# Patient Record
Sex: FEMALE | Race: WHITE | NOT HISPANIC OR LATINO | Employment: UNEMPLOYED | URBAN - METROPOLITAN AREA
[De-identification: names, ages, dates, MRNs, and addresses within clinical notes are randomized per-mention and may not be internally consistent; named-entity substitution may affect disease eponyms.]

---

## 2021-01-01 ENCOUNTER — HOSPITAL ENCOUNTER (INPATIENT)
Facility: CLINIC | Age: 46
LOS: 1 days | End: 2021-10-15
Attending: INTERNAL MEDICINE | Admitting: SURGERY
Payer: COMMERCIAL

## 2021-01-01 ENCOUNTER — ANESTHESIA (OUTPATIENT)
Dept: INTENSIVE CARE | Facility: CLINIC | Age: 46
End: 2021-01-01
Payer: COMMERCIAL

## 2021-01-01 ENCOUNTER — ANESTHESIA EVENT (OUTPATIENT)
Dept: INTENSIVE CARE | Facility: CLINIC | Age: 46
End: 2021-01-01
Payer: COMMERCIAL

## 2021-01-01 ENCOUNTER — APPOINTMENT (OUTPATIENT)
Dept: GENERAL RADIOLOGY | Facility: CLINIC | Age: 46
End: 2021-01-01
Attending: INTERNAL MEDICINE
Payer: COMMERCIAL

## 2021-01-01 ENCOUNTER — HOSPITAL ENCOUNTER (EMERGENCY)
Facility: HOSPITAL | Age: 46
Discharge: ANOTHER HOSPITAL | End: 2021-10-14
Attending: EMERGENCY MEDICINE | Admitting: EMERGENCY MEDICINE
Payer: COMMERCIAL

## 2021-01-01 ENCOUNTER — TRANSFERRED RECORDS (OUTPATIENT)
Dept: HEALTH INFORMATION MANAGEMENT | Facility: CLINIC | Age: 46
End: 2021-01-01

## 2021-01-01 ENCOUNTER — APPOINTMENT (OUTPATIENT)
Dept: ULTRASOUND IMAGING | Facility: CLINIC | Age: 46
End: 2021-01-01
Attending: INTERNAL MEDICINE
Payer: COMMERCIAL

## 2021-01-01 ENCOUNTER — APPOINTMENT (OUTPATIENT)
Dept: GENERAL RADIOLOGY | Facility: CLINIC | Age: 46
End: 2021-01-01
Attending: NURSE PRACTITIONER
Payer: COMMERCIAL

## 2021-01-01 ENCOUNTER — APPOINTMENT (OUTPATIENT)
Dept: CARDIOLOGY | Facility: CLINIC | Age: 46
End: 2021-01-01
Attending: INTERNAL MEDICINE
Payer: COMMERCIAL

## 2021-01-01 ENCOUNTER — APPOINTMENT (OUTPATIENT)
Dept: RADIOLOGY | Facility: HOSPITAL | Age: 46
End: 2021-01-01
Attending: EMERGENCY MEDICINE
Payer: COMMERCIAL

## 2021-01-01 VITALS
RESPIRATION RATE: 29 BRPM | BODY MASS INDEX: 23.96 KG/M2 | HEART RATE: 119 BPM | TEMPERATURE: 98.3 F | DIASTOLIC BLOOD PRESSURE: 60 MMHG | OXYGEN SATURATION: 93 % | SYSTOLIC BLOOD PRESSURE: 143 MMHG | WEIGHT: 153 LBS

## 2021-01-01 VITALS
TEMPERATURE: 98.4 F | BODY MASS INDEX: 28.21 KG/M2 | WEIGHT: 180.12 LBS | OXYGEN SATURATION: 90 % | DIASTOLIC BLOOD PRESSURE: 69 MMHG | SYSTOLIC BLOOD PRESSURE: 116 MMHG | RESPIRATION RATE: 33 BRPM

## 2021-01-01 DIAGNOSIS — R94.31 PROLONGED Q-T INTERVAL ON ECG: ICD-10-CM

## 2021-01-01 DIAGNOSIS — E72.20 HYPERAMMONEMIA (H): ICD-10-CM

## 2021-01-01 DIAGNOSIS — E87.6 HYPOKALEMIA: ICD-10-CM

## 2021-01-01 DIAGNOSIS — F10.10 ALCOHOL ABUSE: ICD-10-CM

## 2021-01-01 DIAGNOSIS — K76.82 HEPATIC ENCEPHALOPATHY (H): ICD-10-CM

## 2021-01-01 DIAGNOSIS — K85.90 ACUTE PANCREATITIS, UNSPECIFIED COMPLICATION STATUS, UNSPECIFIED PANCREATITIS TYPE: ICD-10-CM

## 2021-01-01 DIAGNOSIS — R79.89 ELEVATED LACTIC ACID LEVEL: ICD-10-CM

## 2021-01-01 DIAGNOSIS — K72.00 ACUTE LIVER FAILURE WITHOUT HEPATIC COMA: ICD-10-CM

## 2021-01-01 DIAGNOSIS — R74.8 ELEVATED LIVER ENZYMES: ICD-10-CM

## 2021-01-01 LAB
ABO/RH(D): NORMAL
ABO/RH(D): NORMAL
ALBUMIN SERPL-MCNC: 1.5 G/DL (ref 3.4–5)
ALBUMIN SERPL-MCNC: 1.7 G/DL (ref 3.4–5)
ALBUMIN SERPL-MCNC: 1.7 G/DL (ref 3.4–5)
ALBUMIN SERPL-MCNC: 2 G/DL (ref 3.5–5)
ALBUMIN UR-MCNC: 30 MG/DL
ALP SERPL-CCNC: 338 U/L (ref 40–150)
ALP SERPL-CCNC: 353 U/L (ref 40–150)
ALP SERPL-CCNC: 358 U/L (ref 40–150)
ALP SERPL-CCNC: 461 U/L (ref 40–150)
ALP SERPL-CCNC: 522 U/L (ref 45–120)
ALT SERPL W P-5'-P-CCNC: 55 U/L (ref 0–45)
ALT SERPL W P-5'-P-CCNC: 63 U/L (ref 0–50)
ALT SERPL W P-5'-P-CCNC: 68 U/L (ref 0–50)
ALT SERPL W P-5'-P-CCNC: 85 U/L (ref 0–50)
ALT SERPL W P-5'-P-CCNC: 88 U/L (ref 0–50)
AMMONIA PLAS-SCNC: 116 UMOL/L (ref 11–35)
AMPHETAMINES UR QL SCN: NORMAL
ANA SER QL IF: NEGATIVE
ANCA AB PATTERN SER IF-IMP: NORMAL
ANION GAP SERPL CALCULATED.3IONS-SCNC: 25 MMOL/L (ref 3–14)
ANION GAP SERPL CALCULATED.3IONS-SCNC: 25 MMOL/L (ref 3–14)
ANION GAP SERPL CALCULATED.3IONS-SCNC: 26 MMOL/L (ref 3–14)
ANION GAP SERPL CALCULATED.3IONS-SCNC: 32 MMOL/L (ref 3–14)
ANION GAP SERPL CALCULATED.3IONS-SCNC: 40 MMOL/L (ref 5–18)
ANION GAP SERPL CALCULATED.3IONS-SCNC: >35 MMOL/L (ref 3–14)
ANTIBODY SCREEN: NEGATIVE
ANTIBODY SCREEN: NEGATIVE
APAP SERPL-MCNC: <3 UG/ML (ref 10–20)
APPEARANCE UR: ABNORMAL
APTT PPP: 39 SECONDS (ref 22–38)
APTT PPP: 40 SECONDS (ref 22–38)
APTT PPP: 47 SECONDS (ref 22–38)
AST SERPL W P-5'-P-CCNC: 1539 U/L (ref 0–45)
AST SERPL W P-5'-P-CCNC: 1826 U/L (ref 0–45)
AST SERPL W P-5'-P-CCNC: 2077 U/L (ref 0–45)
AST SERPL W P-5'-P-CCNC: 363 U/L (ref 0–40)
AST SERPL W P-5'-P-CCNC: 967 U/L (ref 0–45)
ATRIAL RATE - MUSE: 130 BPM
BACTERIA UR CULT: NO GROWTH
BARBITURATES UR QL: NORMAL
BASE EXCESS BLDA CALC-SCNC: -11.8 MMOL/L (ref -9–1.8)
BASE EXCESS BLDA CALC-SCNC: -14.1 MMOL/L (ref -9–1.8)
BASE EXCESS BLDA CALC-SCNC: -14.6 MMOL/L (ref -9–1.8)
BASE EXCESS BLDA CALC-SCNC: -14.7 MMOL/L (ref -9–1.8)
BASE EXCESS BLDA CALC-SCNC: -19.5 MMOL/L (ref -9–1.8)
BASE EXCESS BLDA CALC-SCNC: -8.6 MMOL/L (ref -9–1.8)
BASE EXCESS BLDA CALC-SCNC: -9.1 MMOL/L (ref -9–1.8)
BASE EXCESS BLDA CALC-SCNC: -9.2 MMOL/L (ref -9–1.8)
BASE EXCESS BLDA CALC-SCNC: -9.6 MMOL/L (ref -9–1.8)
BASE EXCESS BLDV CALC-SCNC: -19.3 MMOL/L (ref -7.7–1.9)
BASE EXCESS BLDV CALC-SCNC: -19.7 MMOL/L (ref -7.7–1.9)
BASE EXCESS BLDV CALC-SCNC: -8.1 MMOL/L (ref -7.7–1.9)
BASE EXCESS BLDV CALC-SCNC: -8.7 MMOL/L (ref -7.7–1.9)
BASOPHILS # BLD MANUAL: 0 10E3/UL (ref 0–0.2)
BASOPHILS # BLD MANUAL: 0 10E3/UL (ref 0–0.2)
BASOPHILS NFR BLD MANUAL: 0 %
BASOPHILS NFR BLD MANUAL: 0 %
BENZODIAZ UR QL: NORMAL
BILIRUB DIRECT SERPL-MCNC: 10 MG/DL (ref 0–0.2)
BILIRUB DIRECT SERPL-MCNC: 10.1 MG/DL (ref 0–0.2)
BILIRUB DIRECT SERPL-MCNC: 7 MG/DL
BILIRUB DIRECT SERPL-MCNC: 8.9 MG/DL (ref 0–0.2)
BILIRUB DIRECT SERPL-MCNC: 8.9 MG/DL (ref 0–0.2)
BILIRUB SERPL-MCNC: 10.6 MG/DL (ref 0.2–1.3)
BILIRUB SERPL-MCNC: 10.7 MG/DL (ref 0.2–1.3)
BILIRUB SERPL-MCNC: 12.2 MG/DL (ref 0–1)
BILIRUB SERPL-MCNC: 12.8 MG/DL (ref 0.2–1.3)
BILIRUB SERPL-MCNC: 13.2 MG/DL (ref 0.2–1.3)
BILIRUB UR QL STRIP: ABNORMAL
BUN SERPL-MCNC: 3 MG/DL (ref 7–30)
BUN SERPL-MCNC: 3 MG/DL (ref 7–30)
BUN SERPL-MCNC: 3 MG/DL (ref 8–22)
BUN SERPL-MCNC: 4 MG/DL (ref 7–30)
C PNEUM DNA SPEC QL NAA+PROBE: NOT DETECTED
C-ANCA TITR SER IF: NORMAL {TITER}
CA-I BLD-MCNC: 3.7 MG/DL (ref 4.4–5.2)
CA-I BLD-MCNC: 3.8 MG/DL (ref 4.4–5.2)
CA-I BLD-MCNC: 3.8 MG/DL (ref 4.4–5.2)
CA-I BLD-MCNC: 3.9 MG/DL (ref 4.4–5.2)
CALCIUM SERPL-MCNC: 6.7 MG/DL (ref 8.5–10.1)
CALCIUM SERPL-MCNC: 6.8 MG/DL (ref 8.5–10.1)
CALCIUM SERPL-MCNC: 7.2 MG/DL (ref 8.5–10.1)
CALCIUM SERPL-MCNC: 7.2 MG/DL (ref 8.5–10.1)
CALCIUM SERPL-MCNC: 7.4 MG/DL (ref 8.5–10.1)
CALCIUM SERPL-MCNC: 7.6 MG/DL (ref 8.5–10.1)
CALCIUM SERPL-MCNC: 7.6 MG/DL (ref 8.5–10.1)
CALCIUM SERPL-MCNC: 7.9 MG/DL (ref 8.5–10.5)
CANNABINOIDS UR QL SCN: NORMAL
CF REDUC 60M P MA LENFR BLD TEG: 0 % (ref 0–15)
CF REDUC 60M P MA LENFR BLD TEG: 0 % (ref 0–15)
CFT BLD TEG: 1.3 MINUTE (ref 1–3)
CFT BLD TEG: 1.7 MINUTE (ref 1–3)
CHLORIDE BLD-SCNC: 86 MMOL/L (ref 98–107)
CHLORIDE BLD-SCNC: 89 MMOL/L (ref 94–109)
CHLORIDE BLD-SCNC: 93 MMOL/L (ref 94–109)
CHLORIDE BLD-SCNC: 94 MMOL/L (ref 94–109)
CI (COAGULATION INDEX)(Z) NON NATIVE: -1.6 (ref -3–3)
CI (COAGULATION INDEX)(Z) NON NATIVE: 0.5 (ref -3–3)
CK SERPL-CCNC: 54 U/L (ref 30–225)
CLOT ANGLE BLD TEG: 66.8 DEGREES (ref 53–72)
CLOT ANGLE BLD TEG: 71.8 DEGREES (ref 53–72)
CLOT INIT BLD TEG: 6.8 MINUTE (ref 5–10)
CLOT INIT BLD TEG: 9.2 MINUTE (ref 5–10)
CLOT LYSIS 30M P MA LENFR BLD TEG: 0 % (ref 0–8)
CLOT LYSIS 30M P MA LENFR BLD TEG: 0 % (ref 0–8)
CLOT STRENGTH BLD TEG: 8.4 KD/SC (ref 4.5–11)
CLOT STRENGTH BLD TEG: 8.7 KD/SC (ref 4.5–11)
CO2 SERPL-SCNC: 13 MMOL/L (ref 20–32)
CO2 SERPL-SCNC: 17 MMOL/L (ref 20–32)
CO2 SERPL-SCNC: 18 MMOL/L (ref 20–32)
CO2 SERPL-SCNC: 8 MMOL/L (ref 20–32)
CO2 SERPL-SCNC: 9 MMOL/L (ref 22–31)
COCAINE UR QL: NORMAL
COLOR UR AUTO: ABNORMAL
CREAT SERPL-MCNC: 0.58 MG/DL (ref 0.6–1.1)
CREAT SERPL-MCNC: 0.81 MG/DL (ref 0.52–1.04)
CREAT SERPL-MCNC: 0.88 MG/DL (ref 0.52–1.04)
CREAT SERPL-MCNC: 0.88 MG/DL (ref 0.52–1.04)
CREAT SERPL-MCNC: 0.9 MG/DL (ref 0.52–1.04)
CREAT SERPL-MCNC: 0.93 MG/DL (ref 0.52–1.04)
CREAT SERPL-MCNC: 0.96 MG/DL (ref 0.52–1.04)
CREAT SERPL-MCNC: 1.02 MG/DL (ref 0.52–1.04)
CREAT UR-MCNC: 51 MG/DL
DIASTOLIC BLOOD PRESSURE - MUSE: 75 MMHG
EOSINOPHIL # BLD MANUAL: 0 10E3/UL (ref 0–0.7)
EOSINOPHIL # BLD MANUAL: 0 10E3/UL (ref 0–0.7)
EOSINOPHIL NFR BLD MANUAL: 0 %
EOSINOPHIL NFR BLD MANUAL: 0 %
ERYTHROCYTE [DISTWIDTH] IN BLOOD BY AUTOMATED COUNT: 14.8 % (ref 10–15)
ERYTHROCYTE [DISTWIDTH] IN BLOOD BY AUTOMATED COUNT: 14.8 % (ref 10–15)
ERYTHROCYTE [DISTWIDTH] IN BLOOD BY AUTOMATED COUNT: 15.1 % (ref 10–15)
ERYTHROCYTE [DISTWIDTH] IN BLOOD BY AUTOMATED COUNT: 16.7 % (ref 10–15)
ERYTHROCYTE [DISTWIDTH] IN BLOOD BY AUTOMATED COUNT: 17.8 % (ref 10–15)
ERYTHROCYTE [DISTWIDTH] IN BLOOD BY AUTOMATED COUNT: 18.6 % (ref 10–15)
ERYTHROCYTE [DISTWIDTH] IN BLOOD BY AUTOMATED COUNT: 19.2 % (ref 10–15)
ERYTHROCYTE [DISTWIDTH] IN BLOOD BY AUTOMATED COUNT: 19.8 % (ref 10–15)
ERYTHROCYTE [DISTWIDTH] IN BLOOD BY AUTOMATED COUNT: 20.1 % (ref 10–15)
ERYTHROCYTE [DISTWIDTH] IN BLOOD BY AUTOMATED COUNT: 20.1 % (ref 10–15)
ETHANOL SERPL-MCNC: 108 MG/DL
FERRITIN SERPL-MCNC: 7749 NG/ML (ref 12–150)
FIBRINOGEN PPP-MCNC: 157 MG/DL (ref 170–490)
FIBRINOGEN PPP-MCNC: 163 MG/DL (ref 170–490)
FIBRINOGEN PPP-MCNC: 175 MG/DL (ref 170–490)
FLUAV H1 2009 PAND RNA SPEC QL NAA+PROBE: NOT DETECTED
FLUAV H1 RNA SPEC QL NAA+PROBE: NOT DETECTED
FLUAV H3 RNA SPEC QL NAA+PROBE: NOT DETECTED
FLUAV RNA SPEC QL NAA+PROBE: NOT DETECTED
FLUBV RNA SPEC QL NAA+PROBE: NOT DETECTED
GBM IGG SER IA-ACNC: <2.4 U/ML
GBM IGG SER IA-ACNC: NEGATIVE
GFR SERPL CREATININE-BSD FRML MDRD: 67 ML/MIN/1.73M2
GFR SERPL CREATININE-BSD FRML MDRD: 72 ML/MIN/1.73M2
GFR SERPL CREATININE-BSD FRML MDRD: 75 ML/MIN/1.73M2
GFR SERPL CREATININE-BSD FRML MDRD: 78 ML/MIN/1.73M2
GFR SERPL CREATININE-BSD FRML MDRD: 80 ML/MIN/1.73M2
GFR SERPL CREATININE-BSD FRML MDRD: 80 ML/MIN/1.73M2
GFR SERPL CREATININE-BSD FRML MDRD: 89 ML/MIN/1.73M2
GFR SERPL CREATININE-BSD FRML MDRD: >90 ML/MIN/1.73M2
GGT SERPL-CCNC: 1369 U/L (ref 0–40)
GLIADIN IGA SER-ACNC: 1.4 U/ML
GLIADIN IGG SER-ACNC: 1.1 U/ML
GLUCOSE BLD-MCNC: 125 MG/DL (ref 70–99)
GLUCOSE BLD-MCNC: 135 MG/DL (ref 70–99)
GLUCOSE BLD-MCNC: 201 MG/DL (ref 70–99)
GLUCOSE BLD-MCNC: 201 MG/DL (ref 70–99)
GLUCOSE BLD-MCNC: 43 MG/DL (ref 70–99)
GLUCOSE BLD-MCNC: 58 MG/DL (ref 70–125)
GLUCOSE BLD-MCNC: 58 MG/DL (ref 70–99)
GLUCOSE BLD-MCNC: 79 MG/DL (ref 70–99)
GLUCOSE BLDC GLUCOMTR-MCNC: 124 MG/DL (ref 70–99)
GLUCOSE BLDC GLUCOMTR-MCNC: 126 MG/DL (ref 70–99)
GLUCOSE BLDC GLUCOMTR-MCNC: 129 MG/DL (ref 70–99)
GLUCOSE BLDC GLUCOMTR-MCNC: 134 MG/DL (ref 70–99)
GLUCOSE BLDC GLUCOMTR-MCNC: 137 MG/DL (ref 70–99)
GLUCOSE BLDC GLUCOMTR-MCNC: 141 MG/DL (ref 70–99)
GLUCOSE BLDC GLUCOMTR-MCNC: 147 MG/DL (ref 70–99)
GLUCOSE BLDC GLUCOMTR-MCNC: 152 MG/DL (ref 70–99)
GLUCOSE BLDC GLUCOMTR-MCNC: 157 MG/DL (ref 70–99)
GLUCOSE BLDC GLUCOMTR-MCNC: 207 MG/DL (ref 70–99)
GLUCOSE BLDC GLUCOMTR-MCNC: 49 MG/DL (ref 70–99)
GLUCOSE BLDC GLUCOMTR-MCNC: 55 MG/DL (ref 70–99)
GLUCOSE BLDC GLUCOMTR-MCNC: 55 MG/DL (ref 70–99)
GLUCOSE BLDC GLUCOMTR-MCNC: 81 MG/DL (ref 70–99)
GLUCOSE BLDC GLUCOMTR-MCNC: 85 MG/DL (ref 70–99)
GLUCOSE BLDC GLUCOMTR-MCNC: 92 MG/DL (ref 70–99)
GLUCOSE UR STRIP-MCNC: NEGATIVE MG/DL
HADV DNA SPEC QL NAA+PROBE: NOT DETECTED
HAPTOGLOB SERPL-MCNC: 95 MG/DL (ref 32–197)
HAV IGG SER QL IA: REACTIVE
HAV IGM SERPL QL IA: NEGATIVE
HAV IGM SERPL QL IA: NONREACTIVE
HBV CORE AB SERPL QL IA: NONREACTIVE
HBV CORE IGM SERPL QL IA: NEGATIVE
HBV SURFACE AG SERPL QL IA: NONREACTIVE
HBV SURFACE AG SERPL QL IA: NONREACTIVE
HCG SERPL QL: NEGATIVE
HCO3 BLD-SCNC: 12 MMOL/L (ref 21–28)
HCO3 BLD-SCNC: 13 MMOL/L (ref 21–28)
HCO3 BLD-SCNC: 13 MMOL/L (ref 21–28)
HCO3 BLD-SCNC: 15 MMOL/L (ref 21–28)
HCO3 BLD-SCNC: 17 MMOL/L (ref 21–28)
HCO3 BLD-SCNC: 9 MMOL/L (ref 21–28)
HCO3 BLDV-SCNC: 18 MMOL/L (ref 21–28)
HCO3 BLDV-SCNC: 18 MMOL/L (ref 21–28)
HCO3 BLDV-SCNC: 7 MMOL/L (ref 21–28)
HCO3 BLDV-SCNC: 8 MMOL/L (ref 21–28)
HCOV PNL SPEC NAA+PROBE: NOT DETECTED
HCT VFR BLD AUTO: 19.5 % (ref 35–47)
HCT VFR BLD AUTO: 23.4 % (ref 35–47)
HCT VFR BLD AUTO: 24.4 % (ref 35–47)
HCT VFR BLD AUTO: 25.1 % (ref 35–47)
HCT VFR BLD AUTO: 26.3 % (ref 35–47)
HCT VFR BLD AUTO: 26.8 % (ref 35–47)
HCT VFR BLD AUTO: 26.8 % (ref 35–47)
HCT VFR BLD AUTO: 27.7 % (ref 35–47)
HCT VFR BLD AUTO: 28 % (ref 35–47)
HCT VFR BLD AUTO: 28.1 % (ref 35–47)
HCV AB SERPL QL IA: NEGATIVE
HGB BLD-MCNC: 5.8 G/DL (ref 11.7–15.7)
HGB BLD-MCNC: 7.2 G/DL (ref 11.7–15.7)
HGB BLD-MCNC: 7.5 G/DL (ref 11.7–15.7)
HGB BLD-MCNC: 7.9 G/DL (ref 11.7–15.7)
HGB BLD-MCNC: 8.4 G/DL (ref 11.7–15.7)
HGB BLD-MCNC: 9.3 G/DL (ref 11.7–15.7)
HGB BLD-MCNC: 9.6 G/DL (ref 11.7–15.7)
HGB BLD-MCNC: 9.6 G/DL (ref 11.7–15.7)
HGB UR QL STRIP: ABNORMAL
HIV 1+2 AB+HIV1 P24 AG SERPL QL IA: NONREACTIVE
HMPV RNA SPEC QL NAA+PROBE: NOT DETECTED
HOLD SPECIMEN: NORMAL
HPIV1 RNA SPEC QL NAA+PROBE: NOT DETECTED
HPIV2 RNA SPEC QL NAA+PROBE: NOT DETECTED
HPIV3 RNA SPEC QL NAA+PROBE: NOT DETECTED
HPIV4 RNA SPEC QL NAA+PROBE: NOT DETECTED
HYALINE CASTS: 8 /LPF
IGA SERPL-MCNC: 293 MG/DL (ref 84–499)
INR PPP: 1.55 (ref 0.85–1.15)
INR PPP: 1.78 (ref 0.85–1.15)
INR PPP: 2.13 (ref 0.85–1.15)
INR PPP: 2.24 (ref 0.85–1.15)
INTERPRETATION ECG - MUSE: NORMAL
IRON SATN MFR SERPL: 100 % (ref 15–46)
IRON SERPL-MCNC: 76 UG/DL (ref 35–180)
KETONES UR STRIP-MCNC: 40 MG/DL
LACTATE SERPL-SCNC: 19 MMOL/L (ref 0.7–2)
LACTATE SERPL-SCNC: 20 MMOL/L (ref 0.7–2)
LACTATE SERPL-SCNC: 21 MMOL/L (ref 0.7–2)
LACTATE SERPL-SCNC: 21 MMOL/L (ref 0.7–2)
LACTATE SERPL-SCNC: 23 MMOL/L (ref 0.7–2)
LACTATE SERPL-SCNC: 23 MMOL/L (ref 0.7–2)
LACTATE SERPL-SCNC: 26 MMOL/L (ref 0.7–2)
LACTATE SERPL-SCNC: 27 MMOL/L (ref 0.7–2)
LACTATE SERPL-SCNC: >27.9 MMOL/L (ref 0.7–2)
LACTATE SERPL-SCNC: >27.9 MMOL/L (ref 0.7–2)
LDH SERPL L TO P-CCNC: 1213 U/L (ref 81–234)
LEUKOCYTE ESTERASE UR QL STRIP: NEGATIVE
LIPASE SERPL-CCNC: 7112 U/L (ref 73–393)
LIPASE SERPL-CCNC: 758 U/L (ref 0–52)
LVEF ECHO: NORMAL
LYMPHOCYTES # BLD MANUAL: 0.3 10E3/UL (ref 0.8–5.3)
LYMPHOCYTES # BLD MANUAL: 0.3 10E3/UL (ref 0.8–5.3)
LYMPHOCYTES NFR BLD MANUAL: 3 %
LYMPHOCYTES NFR BLD MANUAL: 5 %
M PNEUMO DNA SPEC QL NAA+PROBE: NOT DETECTED
MAGNESIUM SERPL-MCNC: 1.6 MG/DL (ref 1.6–2.3)
MAGNESIUM SERPL-MCNC: 1.8 MG/DL (ref 1.8–2.6)
MAGNESIUM SERPL-MCNC: 1.9 MG/DL (ref 1.6–2.3)
MAGNESIUM SERPL-MCNC: 2.3 MG/DL (ref 1.6–2.3)
MAGNESIUM SERPL-MCNC: 2.4 MG/DL (ref 1.6–2.3)
MAGNESIUM SERPL-MCNC: 2.5 MG/DL (ref 1.6–2.3)
MCF BLD TEG: 62.8 MM (ref 50–70)
MCF BLD TEG: 63.5 MM (ref 50–70)
MCH RBC QN AUTO: 35.7 PG (ref 26.5–33)
MCH RBC QN AUTO: 36.5 PG (ref 26.5–33)
MCH RBC QN AUTO: 36.7 PG (ref 26.5–33)
MCH RBC QN AUTO: 36.9 PG (ref 26.5–33)
MCH RBC QN AUTO: 37.5 PG (ref 26.5–33)
MCH RBC QN AUTO: 37.5 PG (ref 26.5–33)
MCH RBC QN AUTO: 37.6 PG (ref 26.5–33)
MCH RBC QN AUTO: 37.9 PG (ref 26.5–33)
MCH RBC QN AUTO: 39.2 PG (ref 26.5–33)
MCH RBC QN AUTO: 39.2 PG (ref 26.5–33)
MCHC RBC AUTO-ENTMCNC: 29.7 G/DL (ref 31.5–36.5)
MCHC RBC AUTO-ENTMCNC: 30.7 G/DL (ref 31.5–36.5)
MCHC RBC AUTO-ENTMCNC: 30.8 G/DL (ref 31.5–36.5)
MCHC RBC AUTO-ENTMCNC: 31.5 G/DL (ref 31.5–36.5)
MCHC RBC AUTO-ENTMCNC: 31.9 G/DL (ref 31.5–36.5)
MCHC RBC AUTO-ENTMCNC: 33.6 G/DL (ref 31.5–36.5)
MCHC RBC AUTO-ENTMCNC: 34.2 G/DL (ref 31.5–36.5)
MCHC RBC AUTO-ENTMCNC: 34.3 G/DL (ref 31.5–36.5)
MCHC RBC AUTO-ENTMCNC: 34.7 G/DL (ref 31.5–36.5)
MCHC RBC AUTO-ENTMCNC: 34.7 G/DL (ref 31.5–36.5)
MCV RBC AUTO: 110 FL (ref 78–100)
MCV RBC AUTO: 112 FL (ref 78–100)
MCV RBC AUTO: 113 FL (ref 78–100)
MCV RBC AUTO: 113 FL (ref 78–100)
MCV RBC AUTO: 117 FL (ref 78–100)
MCV RBC AUTO: 119 FL (ref 78–100)
MCV RBC AUTO: 122 FL (ref 78–100)
MCV RBC AUTO: 128 FL (ref 78–100)
METAMYELOCYTES # BLD MANUAL: 0.1 10E3/UL
METAMYELOCYTES NFR BLD MANUAL: 1 %
MONOCYTES # BLD MANUAL: 0.2 10E3/UL (ref 0–1.3)
MONOCYTES # BLD MANUAL: 0.2 10E3/UL (ref 0–1.3)
MONOCYTES NFR BLD MANUAL: 2 %
MONOCYTES NFR BLD MANUAL: 4 %
MRSA DNA SPEC QL NAA+PROBE: NEGATIVE
MYELOCYTES # BLD MANUAL: 0.1 10E3/UL
MYELOCYTES NFR BLD MANUAL: 1 %
NEUTROPHILS # BLD MANUAL: 4.6 10E3/UL (ref 1.6–8.3)
NEUTROPHILS # BLD MANUAL: 8 10E3/UL (ref 1.6–8.3)
NEUTROPHILS NFR BLD MANUAL: 91 %
NEUTROPHILS NFR BLD MANUAL: 93 %
NITRATE UR QL: NEGATIVE
NRBC # BLD AUTO: 0.1 10E3/UL
NRBC # BLD AUTO: 0.3 10E3/UL
NRBC BLD MANUAL-RTO: 1 %
NRBC BLD MANUAL-RTO: 3 %
O2/TOTAL GAS SETTING VFR VENT: 100 %
O2/TOTAL GAS SETTING VFR VENT: 4 %
O2/TOTAL GAS SETTING VFR VENT: 70 %
O2/TOTAL GAS SETTING VFR VENT: 80 %
OPIATES UR QL SCN: NORMAL
OXYCODONE UR QL: NORMAL
OXYHGB MFR BLDV: 59 % (ref 70–75)
OXYHGB MFR BLDV: 66 % (ref 70–75)
OXYHGB MFR BLDV: 68 % (ref 70–75)
OXYHGB MFR BLDV: 68 % (ref 70–75)
P AXIS - MUSE: NORMAL DEGREES
PATH REPORT.COMMENTS IMP SPEC: NORMAL
PATH REPORT.FINAL DX SPEC: NORMAL
PATH REPORT.MICROSCOPIC SPEC OTHER STN: NORMAL
PATH REPORT.MICROSCOPIC SPEC OTHER STN: NORMAL
PATH REPORT.RELEVANT HX SPEC: NORMAL
PATH REV: ABNORMAL
PCO2 BLD: 28 MM HG (ref 35–45)
PCO2 BLD: 29 MM HG (ref 35–45)
PCO2 BLD: 34 MM HG (ref 35–45)
PCO2 BLD: 36 MM HG (ref 35–45)
PCO2 BLD: 37 MM HG (ref 35–45)
PCO2 BLD: 38 MM HG (ref 35–45)
PCO2 BLD: 38 MM HG (ref 35–45)
PCO2 BLDV: 21 MM HG (ref 40–50)
PCO2 BLDV: 24 MM HG (ref 40–50)
PCO2 BLDV: 40 MM HG (ref 40–50)
PCO2 BLDV: 41 MM HG (ref 40–50)
PCP UR QL SCN: NORMAL
PH BLD: 7.09 [PH] (ref 7.35–7.45)
PH BLD: 7.17 [PH] (ref 7.35–7.45)
PH BLD: 7.19 [PH] (ref 7.35–7.45)
PH BLD: 7.21 [PH] (ref 7.35–7.45)
PH BLD: 7.23 [PH] (ref 7.35–7.45)
PH BLD: 7.26 [PH] (ref 7.35–7.45)
PH BLD: 7.27 [PH] (ref 7.35–7.45)
PH BLD: 7.28 [PH] (ref 7.35–7.45)
PH BLD: 7.28 [PH] (ref 7.35–7.45)
PH BLDV: 7.14 [PH] (ref 7.32–7.43)
PH BLDV: 7.15 [PH] (ref 7.32–7.43)
PH BLDV: 7.25 [PH] (ref 7.32–7.43)
PH BLDV: 7.27 [PH] (ref 7.32–7.43)
PH UR STRIP: 5.5 [PH] (ref 5–7)
PHOSPHATE SERPL-MCNC: 1.6 MG/DL (ref 2.5–4.5)
PHOSPHATE SERPL-MCNC: 2.2 MG/DL (ref 2.5–4.5)
PHOSPHATE SERPL-MCNC: 2.4 MG/DL (ref 2.5–4.5)
PHOSPHATE SERPL-MCNC: 2.7 MG/DL (ref 2.5–4.5)
PHOSPHATE SERPL-MCNC: 2.8 MG/DL (ref 2.5–4.5)
PLAT MORPH BLD: ABNORMAL
PLAT MORPH BLD: ABNORMAL
PLATELET # BLD AUTO: 131 10E3/UL (ref 150–450)
PLATELET # BLD AUTO: 149 10E3/UL (ref 150–450)
PLATELET # BLD AUTO: 77 10E3/UL (ref 150–450)
PLATELET # BLD AUTO: 85 10E3/UL (ref 150–450)
PLATELET # BLD AUTO: 85 10E3/UL (ref 150–450)
PLATELET # BLD AUTO: 91 10E3/UL (ref 150–450)
PLATELET # BLD AUTO: 92 10E3/UL (ref 150–450)
PLATELET # BLD AUTO: 92 10E3/UL (ref 150–450)
PLATELET # BLD AUTO: 97 10E3/UL (ref 150–450)
PLATELET # BLD AUTO: 98 10E3/UL (ref 150–450)
PO2 BLD: 108 MM HG (ref 80–105)
PO2 BLD: 183 MM HG (ref 80–105)
PO2 BLD: 65 MM HG (ref 80–105)
PO2 BLD: 69 MM HG (ref 80–105)
PO2 BLD: 74 MM HG (ref 80–105)
PO2 BLD: 83 MM HG (ref 80–105)
PO2 BLD: 85 MM HG (ref 80–105)
PO2 BLD: 90 MM HG (ref 80–105)
PO2 BLD: 95 MM HG (ref 80–105)
PO2 BLDV: 42 MM HG (ref 25–47)
PO2 BLDV: 47 MM HG (ref 25–47)
PO2 BLDV: 48 MM HG (ref 25–47)
PO2 BLDV: 52 MM HG (ref 25–47)
POTASSIUM BLD-SCNC: 2.8 MMOL/L (ref 3.5–5)
POTASSIUM BLD-SCNC: 2.9 MMOL/L (ref 3.4–5.3)
POTASSIUM BLD-SCNC: 3.2 MMOL/L (ref 3.4–5.3)
POTASSIUM BLD-SCNC: 3.6 MMOL/L (ref 3.4–5.3)
POTASSIUM BLD-SCNC: 3.6 MMOL/L (ref 3.4–5.3)
POTASSIUM BLD-SCNC: 3.8 MMOL/L (ref 3.4–5.3)
POTASSIUM BLD-SCNC: 3.9 MMOL/L (ref 3.4–5.3)
POTASSIUM BLD-SCNC: 4 MMOL/L (ref 3.4–5.3)
POTASSIUM BLD-SCNC: 4 MMOL/L (ref 3.4–5.3)
PR INTERVAL - MUSE: 112 MS
PROCALCITONIN SERPL-MCNC: 1.63 NG/ML
PROT SERPL-MCNC: 4.8 G/DL (ref 6.8–8.8)
PROT SERPL-MCNC: 4.9 G/DL (ref 6.8–8.8)
PROT SERPL-MCNC: 5 G/DL (ref 6.8–8.8)
PROT SERPL-MCNC: 5.6 G/DL (ref 6.8–8.8)
PROT SERPL-MCNC: 5.9 G/DL (ref 6–8)
QRS DURATION - MUSE: 66 MS
QT - MUSE: 386 MS
QTC - MUSE: 568 MS
R AXIS - MUSE: 11 DEGREES
RBC # BLD AUTO: 1.53 10E6/UL (ref 3.8–5.2)
RBC # BLD AUTO: 1.97 10E6/UL (ref 3.8–5.2)
RBC # BLD AUTO: 2 10E6/UL (ref 3.8–5.2)
RBC # BLD AUTO: 2.15 10E6/UL (ref 3.8–5.2)
RBC # BLD AUTO: 2.35 10E6/UL (ref 3.8–5.2)
RBC # BLD AUTO: 2.37 10E6/UL (ref 3.8–5.2)
RBC # BLD AUTO: 2.37 10E6/UL (ref 3.8–5.2)
RBC # BLD AUTO: 2.52 10E6/UL (ref 3.8–5.2)
RBC # BLD AUTO: 2.55 10E6/UL (ref 3.8–5.2)
RBC # BLD AUTO: 2.56 10E6/UL (ref 3.8–5.2)
RBC MORPH BLD: ABNORMAL
RBC MORPH BLD: ABNORMAL
RBC URINE: 4 /HPF
RETICS # AUTO: 0.03 10E6/UL (ref 0.03–0.1)
RETICS # AUTO: 0.04 10E6/UL (ref 0.03–0.1)
RETICS # AUTO: 0.04 10E6/UL (ref 0.03–0.1)
RETICS/RBC NFR AUTO: 1.6 % (ref 0.5–2)
RETICS/RBC NFR AUTO: 2 % (ref 0.5–2)
RETICS/RBC NFR AUTO: 2.2 % (ref 0.5–2)
RSV RNA SPEC QL NAA+PROBE: NOT DETECTED
RSV RNA SPEC QL NAA+PROBE: NOT DETECTED
RV+EV RNA SPEC QL NAA+PROBE: NOT DETECTED
SA TARGET DNA: NEGATIVE
SARS-COV-2 RNA RESP QL NAA+PROBE: NEGATIVE
SODIUM SERPL-SCNC: 135 MMOL/L (ref 136–145)
SODIUM SERPL-SCNC: 136 MMOL/L (ref 133–144)
SODIUM SERPL-SCNC: 136 MMOL/L (ref 133–144)
SODIUM SERPL-SCNC: 137 MMOL/L (ref 133–144)
SODIUM SERPL-SCNC: 139 MMOL/L (ref 133–144)
SP GR UR STRIP: 1.04 (ref 1–1.03)
SPECIMEN EXPIRATION DATE: NORMAL
SPECIMEN EXPIRATION DATE: NORMAL
SQUAMOUS EPITHELIAL: 3 /HPF
SYSTOLIC BLOOD PRESSURE - MUSE: 133 MMHG
T AXIS - MUSE: 37 DEGREES
TIBC SERPL-MCNC: 76 UG/DL (ref 240–430)
TOXIC GRANULES BLD QL SMEAR: PRESENT
TOXIC GRANULES BLD QL SMEAR: PRESENT
TRIGL SERPL-MCNC: 263 MG/DL
TROPONIN I SERPL-MCNC: 0.03 NG/ML (ref 0–0.29)
TTG IGA SER-ACNC: 0.8 U/ML
TTG IGG SER-ACNC: 0.7 U/ML
UROBILINOGEN UR STRIP-MCNC: <2 MG/DL
VENTRICULAR RATE- MUSE: 130 BPM
WBC # BLD AUTO: 13.7 10E3/UL (ref 4–11)
WBC # BLD AUTO: 14.7 10E3/UL (ref 4–11)
WBC # BLD AUTO: 4.8 10E3/UL (ref 4–11)
WBC # BLD AUTO: 5.1 10E3/UL (ref 4–11)
WBC # BLD AUTO: 5.8 10E3/UL (ref 4–11)
WBC # BLD AUTO: 6.1 10E3/UL (ref 4–11)
WBC # BLD AUTO: 8.6 10E3/UL (ref 4–11)
WBC # BLD AUTO: 9.5 10E3/UL (ref 4–11)
WBC URINE: 29 /HPF

## 2021-01-01 PROCEDURE — 250N000011 HC RX IP 250 OP 636: Performed by: STUDENT IN AN ORGANIZED HEALTH CARE EDUCATION/TRAINING PROGRAM

## 2021-01-01 PROCEDURE — 85730 THROMBOPLASTIN TIME PARTIAL: CPT | Performed by: STUDENT IN AN ORGANIZED HEALTH CARE EDUCATION/TRAINING PROGRAM

## 2021-01-01 PROCEDURE — 999N000157 HC STATISTIC RCP TIME EA 10 MIN

## 2021-01-01 PROCEDURE — 82803 BLOOD GASES ANY COMBINATION: CPT | Performed by: NURSE PRACTITIONER

## 2021-01-01 PROCEDURE — 84478 ASSAY OF TRIGLYCERIDES: CPT | Performed by: NURSE PRACTITIONER

## 2021-01-01 PROCEDURE — 82248 BILIRUBIN DIRECT: CPT | Performed by: EMERGENCY MEDICINE

## 2021-01-01 PROCEDURE — 71045 X-RAY EXAM CHEST 1 VIEW: CPT

## 2021-01-01 PROCEDURE — 36592 COLLECT BLOOD FROM PICC: CPT

## 2021-01-01 PROCEDURE — 83735 ASSAY OF MAGNESIUM: CPT | Performed by: INTERNAL MEDICINE

## 2021-01-01 PROCEDURE — 250N000009 HC RX 250: Performed by: EMERGENCY MEDICINE

## 2021-01-01 PROCEDURE — 258N000001 HC RX 258: Performed by: STUDENT IN AN ORGANIZED HEALTH CARE EDUCATION/TRAINING PROGRAM

## 2021-01-01 PROCEDURE — 83690 ASSAY OF LIPASE: CPT | Performed by: EMERGENCY MEDICINE

## 2021-01-01 PROCEDURE — 86255 FLUORESCENT ANTIBODY SCREEN: CPT | Performed by: STUDENT IN AN ORGANIZED HEALTH CARE EDUCATION/TRAINING PROGRAM

## 2021-01-01 PROCEDURE — 81001 URINALYSIS AUTO W/SCOPE: CPT | Performed by: EMERGENCY MEDICINE

## 2021-01-01 PROCEDURE — 80074 ACUTE HEPATITIS PANEL: CPT | Performed by: EMERGENCY MEDICINE

## 2021-01-01 PROCEDURE — 85014 HEMATOCRIT: CPT | Performed by: EMERGENCY MEDICINE

## 2021-01-01 PROCEDURE — 250N000011 HC RX IP 250 OP 636: Performed by: INTERNAL MEDICINE

## 2021-01-01 PROCEDURE — 82977 ASSAY OF GGT: CPT | Performed by: STUDENT IN AN ORGANIZED HEALTH CARE EDUCATION/TRAINING PROGRAM

## 2021-01-01 PROCEDURE — 87581 M.PNEUMON DNA AMP PROBE: CPT | Performed by: STUDENT IN AN ORGANIZED HEALTH CARE EDUCATION/TRAINING PROGRAM

## 2021-01-01 PROCEDURE — 36592 COLLECT BLOOD FROM PICC: CPT | Performed by: INTERNAL MEDICINE

## 2021-01-01 PROCEDURE — 84100 ASSAY OF PHOSPHORUS: CPT | Performed by: INTERNAL MEDICINE

## 2021-01-01 PROCEDURE — 258N000003 HC RX IP 258 OP 636: Performed by: EMERGENCY MEDICINE

## 2021-01-01 PROCEDURE — 71045 X-RAY EXAM CHEST 1 VIEW: CPT | Mod: 26 | Performed by: RADIOLOGY

## 2021-01-01 PROCEDURE — 250N000009 HC RX 250: Performed by: STUDENT IN AN ORGANIZED HEALTH CARE EDUCATION/TRAINING PROGRAM

## 2021-01-01 PROCEDURE — 36415 COLL VENOUS BLD VENIPUNCTURE: CPT | Mod: XS | Performed by: EMERGENCY MEDICINE

## 2021-01-01 PROCEDURE — 80307 DRUG TEST PRSMV CHEM ANLYZR: CPT | Performed by: EMERGENCY MEDICINE

## 2021-01-01 PROCEDURE — 87040 BLOOD CULTURE FOR BACTERIA: CPT | Performed by: EMERGENCY MEDICINE

## 2021-01-01 PROCEDURE — 82040 ASSAY OF SERUM ALBUMIN: CPT | Performed by: INTERNAL MEDICINE

## 2021-01-01 PROCEDURE — 36569 INSJ PICC 5 YR+ W/O IMAGING: CPT

## 2021-01-01 PROCEDURE — 83605 ASSAY OF LACTIC ACID: CPT | Mod: 91 | Performed by: EMERGENCY MEDICINE

## 2021-01-01 PROCEDURE — 5A1935Z RESPIRATORY VENTILATION, LESS THAN 24 CONSECUTIVE HOURS: ICD-10-PCS | Performed by: EMERGENCY MEDICINE

## 2021-01-01 PROCEDURE — 36415 COLL VENOUS BLD VENIPUNCTURE: CPT | Performed by: STUDENT IN AN ORGANIZED HEALTH CARE EDUCATION/TRAINING PROGRAM

## 2021-01-01 PROCEDURE — 93975 VASCULAR STUDY: CPT

## 2021-01-01 PROCEDURE — 86704 HEP B CORE ANTIBODY TOTAL: CPT | Performed by: INTERNAL MEDICINE

## 2021-01-01 PROCEDURE — 94002 VENT MGMT INPAT INIT DAY: CPT

## 2021-01-01 PROCEDURE — 80053 COMPREHEN METABOLIC PANEL: CPT | Performed by: STUDENT IN AN ORGANIZED HEALTH CARE EDUCATION/TRAINING PROGRAM

## 2021-01-01 PROCEDURE — 82310 ASSAY OF CALCIUM: CPT | Performed by: EMERGENCY MEDICINE

## 2021-01-01 PROCEDURE — 258N000001 HC RX 258

## 2021-01-01 PROCEDURE — 999N000065 XR CHEST PORT 1 VIEW: Mod: 77

## 2021-01-01 PROCEDURE — 80143 DRUG ASSAY ACETAMINOPHEN: CPT | Performed by: EMERGENCY MEDICINE

## 2021-01-01 PROCEDURE — 74018 RADEX ABDOMEN 1 VIEW: CPT

## 2021-01-01 PROCEDURE — 85027 COMPLETE CBC AUTOMATED: CPT | Performed by: NURSE PRACTITIONER

## 2021-01-01 PROCEDURE — 99233 SBSQ HOSP IP/OBS HIGH 50: CPT | Performed by: STUDENT IN AN ORGANIZED HEALTH CARE EDUCATION/TRAINING PROGRAM

## 2021-01-01 PROCEDURE — 84155 ASSAY OF PROTEIN SERUM: CPT | Performed by: STUDENT IN AN ORGANIZED HEALTH CARE EDUCATION/TRAINING PROGRAM

## 2021-01-01 PROCEDURE — 86038 ANTINUCLEAR ANTIBODIES: CPT | Performed by: STUDENT IN AN ORGANIZED HEALTH CARE EDUCATION/TRAINING PROGRAM

## 2021-01-01 PROCEDURE — 258N000003 HC RX IP 258 OP 636: Performed by: STUDENT IN AN ORGANIZED HEALTH CARE EDUCATION/TRAINING PROGRAM

## 2021-01-01 PROCEDURE — 71045 X-RAY EXAM CHEST 1 VIEW: CPT | Mod: 76

## 2021-01-01 PROCEDURE — 84132 ASSAY OF SERUM POTASSIUM: CPT | Performed by: NURSE PRACTITIONER

## 2021-01-01 PROCEDURE — 99223 1ST HOSP IP/OBS HIGH 75: CPT | Mod: GC | Performed by: INTERNAL MEDICINE

## 2021-01-01 PROCEDURE — 5A1D90Z PERFORMANCE OF URINARY FILTRATION, CONTINUOUS, GREATER THAN 18 HOURS PER DAY: ICD-10-PCS | Performed by: STUDENT IN AN ORGANIZED HEALTH CARE EDUCATION/TRAINING PROGRAM

## 2021-01-01 PROCEDURE — 80069 RENAL FUNCTION PANEL: CPT | Performed by: STUDENT IN AN ORGANIZED HEALTH CARE EDUCATION/TRAINING PROGRAM

## 2021-01-01 PROCEDURE — 99291 CRITICAL CARE FIRST HOUR: CPT | Mod: GC | Performed by: STUDENT IN AN ORGANIZED HEALTH CARE EDUCATION/TRAINING PROGRAM

## 2021-01-01 PROCEDURE — 82803 BLOOD GASES ANY COMBINATION: CPT | Performed by: STUDENT IN AN ORGANIZED HEALTH CARE EDUCATION/TRAINING PROGRAM

## 2021-01-01 PROCEDURE — P9041 ALBUMIN (HUMAN),5%, 50ML: HCPCS | Performed by: STUDENT IN AN ORGANIZED HEALTH CARE EDUCATION/TRAINING PROGRAM

## 2021-01-01 PROCEDURE — 82373 ASSAY C-D TRANSFER MEASURE: CPT | Performed by: STUDENT IN AN ORGANIZED HEALTH CARE EDUCATION/TRAINING PROGRAM

## 2021-01-01 PROCEDURE — 82077 ASSAY SPEC XCP UR&BREATH IA: CPT | Performed by: EMERGENCY MEDICINE

## 2021-01-01 PROCEDURE — 93005 ELECTROCARDIOGRAM TRACING: CPT | Performed by: EMERGENCY MEDICINE

## 2021-01-01 PROCEDURE — 82248 BILIRUBIN DIRECT: CPT | Performed by: STUDENT IN AN ORGANIZED HEALTH CARE EDUCATION/TRAINING PROGRAM

## 2021-01-01 PROCEDURE — 85396 CLOTTING ASSAY WHOLE BLOOD: CPT | Performed by: STUDENT IN AN ORGANIZED HEALTH CARE EDUCATION/TRAINING PROGRAM

## 2021-01-01 PROCEDURE — 85045 AUTOMATED RETICULOCYTE COUNT: CPT | Performed by: INTERNAL MEDICINE

## 2021-01-01 PROCEDURE — 250N000011 HC RX IP 250 OP 636

## 2021-01-01 PROCEDURE — 82330 ASSAY OF CALCIUM: CPT | Performed by: STUDENT IN AN ORGANIZED HEALTH CARE EDUCATION/TRAINING PROGRAM

## 2021-01-01 PROCEDURE — 90947 DIALYSIS REPEATED EVAL: CPT

## 2021-01-01 PROCEDURE — 250N000011 HC RX IP 250 OP 636: Performed by: EMERGENCY MEDICINE

## 2021-01-01 PROCEDURE — 83735 ASSAY OF MAGNESIUM: CPT | Performed by: EMERGENCY MEDICINE

## 2021-01-01 PROCEDURE — C9113 INJ PANTOPRAZOLE SODIUM, VIA: HCPCS | Performed by: STUDENT IN AN ORGANIZED HEALTH CARE EDUCATION/TRAINING PROGRAM

## 2021-01-01 PROCEDURE — 370N000003 HC ANESTHESIA WARD SERVICE

## 2021-01-01 PROCEDURE — 258N000003 HC RX IP 258 OP 636

## 2021-01-01 PROCEDURE — C9803 HOPD COVID-19 SPEC COLLECT: HCPCS

## 2021-01-01 PROCEDURE — 83605 ASSAY OF LACTIC ACID: CPT | Performed by: STUDENT IN AN ORGANIZED HEALTH CARE EDUCATION/TRAINING PROGRAM

## 2021-01-01 PROCEDURE — 84145 PROCALCITONIN (PCT): CPT | Performed by: STUDENT IN AN ORGANIZED HEALTH CARE EDUCATION/TRAINING PROGRAM

## 2021-01-01 PROCEDURE — 250N000009 HC RX 250

## 2021-01-01 PROCEDURE — 250N000009 HC RX 250: Performed by: INTERNAL MEDICINE

## 2021-01-01 PROCEDURE — 85610 PROTHROMBIN TIME: CPT | Performed by: EMERGENCY MEDICINE

## 2021-01-01 PROCEDURE — 250N000011 HC RX IP 250 OP 636: Performed by: NURSE PRACTITIONER

## 2021-01-01 PROCEDURE — 85610 PROTHROMBIN TIME: CPT | Performed by: STUDENT IN AN ORGANIZED HEALTH CARE EDUCATION/TRAINING PROGRAM

## 2021-01-01 PROCEDURE — 85384 FIBRINOGEN ACTIVITY: CPT | Performed by: STUDENT IN AN ORGANIZED HEALTH CARE EDUCATION/TRAINING PROGRAM

## 2021-01-01 PROCEDURE — 83605 ASSAY OF LACTIC ACID: CPT | Performed by: NURSE PRACTITIONER

## 2021-01-01 PROCEDURE — 85041 AUTOMATED RBC COUNT: CPT | Performed by: NURSE PRACTITIONER

## 2021-01-01 PROCEDURE — 86900 BLOOD TYPING SEROLOGIC ABO: CPT | Performed by: INTERNAL MEDICINE

## 2021-01-01 PROCEDURE — 200N000001 HC R&B ICU

## 2021-01-01 PROCEDURE — 83735 ASSAY OF MAGNESIUM: CPT | Performed by: STUDENT IN AN ORGANIZED HEALTH CARE EDUCATION/TRAINING PROGRAM

## 2021-01-01 PROCEDURE — 272N000452 HC KIT SHRLOCK 5FR POWER PICC TRIPLE LUMEN

## 2021-01-01 PROCEDURE — 82805 BLOOD GASES W/O2 SATURATION: CPT

## 2021-01-01 PROCEDURE — 84100 ASSAY OF PHOSPHORUS: CPT | Performed by: STUDENT IN AN ORGANIZED HEALTH CARE EDUCATION/TRAINING PROGRAM

## 2021-01-01 PROCEDURE — 82962 GLUCOSE BLOOD TEST: CPT

## 2021-01-01 PROCEDURE — 85045 AUTOMATED RETICULOCYTE COUNT: CPT

## 2021-01-01 PROCEDURE — 96368 THER/DIAG CONCURRENT INF: CPT | Mod: 59

## 2021-01-01 PROCEDURE — 82330 ASSAY OF CALCIUM: CPT | Performed by: INTERNAL MEDICINE

## 2021-01-01 PROCEDURE — 96375 TX/PRO/DX INJ NEW DRUG ADDON: CPT

## 2021-01-01 PROCEDURE — 82728 ASSAY OF FERRITIN: CPT | Performed by: INTERNAL MEDICINE

## 2021-01-01 PROCEDURE — 84703 CHORIONIC GONADOTROPIN ASSAY: CPT | Performed by: EMERGENCY MEDICINE

## 2021-01-01 PROCEDURE — 99291 CRITICAL CARE FIRST HOUR: CPT | Performed by: SURGERY

## 2021-01-01 PROCEDURE — 85027 COMPLETE CBC AUTOMATED: CPT

## 2021-01-01 PROCEDURE — 96365 THER/PROPH/DIAG IV INF INIT: CPT | Mod: 59

## 2021-01-01 PROCEDURE — 87633 RESP VIRUS 12-25 TARGETS: CPT | Performed by: STUDENT IN AN ORGANIZED HEALTH CARE EDUCATION/TRAINING PROGRAM

## 2021-01-01 PROCEDURE — 96367 TX/PROPH/DG ADDL SEQ IV INF: CPT | Mod: 59

## 2021-01-01 PROCEDURE — 258N000003 HC RX IP 258 OP 636: Performed by: INTERNAL MEDICINE

## 2021-01-01 PROCEDURE — 83550 IRON BINDING TEST: CPT | Performed by: INTERNAL MEDICINE

## 2021-01-01 PROCEDURE — 200N000002 HC R&B ICU UMMC

## 2021-01-01 PROCEDURE — 83010 ASSAY OF HAPTOGLOBIN QUANT: CPT

## 2021-01-01 PROCEDURE — 84075 ASSAY ALKALINE PHOSPHATASE: CPT | Performed by: STUDENT IN AN ORGANIZED HEALTH CARE EDUCATION/TRAINING PROGRAM

## 2021-01-01 PROCEDURE — 85384 FIBRINOGEN ACTIVITY: CPT | Performed by: INTERNAL MEDICINE

## 2021-01-01 PROCEDURE — 86708 HEPATITIS A ANTIBODY: CPT

## 2021-01-01 PROCEDURE — 36592 COLLECT BLOOD FROM PICC: CPT | Performed by: STUDENT IN AN ORGANIZED HEALTH CARE EDUCATION/TRAINING PROGRAM

## 2021-01-01 PROCEDURE — 87389 HIV-1 AG W/HIV-1&-2 AB AG IA: CPT | Performed by: STUDENT IN AN ORGANIZED HEALTH CARE EDUCATION/TRAINING PROGRAM

## 2021-01-01 PROCEDURE — 86709 HEPATITIS A IGM ANTIBODY: CPT

## 2021-01-01 PROCEDURE — 85396 CLOTTING ASSAY WHOLE BLOOD: CPT

## 2021-01-01 PROCEDURE — 80069 RENAL FUNCTION PANEL: CPT | Performed by: NURSE PRACTITIONER

## 2021-01-01 PROCEDURE — 87516 HEPATITIS B DNA AMP PROBE: CPT

## 2021-01-01 PROCEDURE — 93306 TTE W/DOPPLER COMPLETE: CPT

## 2021-01-01 PROCEDURE — 83615 LACTATE (LD) (LDH) ENZYME: CPT

## 2021-01-01 PROCEDURE — 96366 THER/PROPH/DIAG IV INF ADDON: CPT | Mod: 59

## 2021-01-01 PROCEDURE — 250N000013 HC RX MED GY IP 250 OP 250 PS 637: Performed by: EMERGENCY MEDICINE

## 2021-01-01 PROCEDURE — 99223 1ST HOSP IP/OBS HIGH 75: CPT | Performed by: SURGERY

## 2021-01-01 PROCEDURE — 82805 BLOOD GASES W/O2 SATURATION: CPT | Performed by: STUDENT IN AN ORGANIZED HEALTH CARE EDUCATION/TRAINING PROGRAM

## 2021-01-01 PROCEDURE — 93306 TTE W/DOPPLER COMPLETE: CPT | Mod: 26 | Performed by: INTERNAL MEDICINE

## 2021-01-01 PROCEDURE — 83690 ASSAY OF LIPASE: CPT | Performed by: STUDENT IN AN ORGANIZED HEALTH CARE EDUCATION/TRAINING PROGRAM

## 2021-01-01 PROCEDURE — 87635 SARS-COV-2 COVID-19 AMP PRB: CPT | Performed by: EMERGENCY MEDICINE

## 2021-01-01 PROCEDURE — 83516 IMMUNOASSAY NONANTIBODY: CPT | Performed by: STUDENT IN AN ORGANIZED HEALTH CARE EDUCATION/TRAINING PROGRAM

## 2021-01-01 PROCEDURE — 84484 ASSAY OF TROPONIN QUANT: CPT | Performed by: EMERGENCY MEDICINE

## 2021-01-01 PROCEDURE — 87340 HEPATITIS B SURFACE AG IA: CPT | Performed by: INTERNAL MEDICINE

## 2021-01-01 PROCEDURE — 36592 COLLECT BLOOD FROM PICC: CPT | Performed by: NURSE PRACTITIONER

## 2021-01-01 PROCEDURE — 258N000001 HC RX 258: Performed by: EMERGENCY MEDICINE

## 2021-01-01 PROCEDURE — 87086 URINE CULTURE/COLONY COUNT: CPT | Performed by: EMERGENCY MEDICINE

## 2021-01-01 PROCEDURE — 82140 ASSAY OF AMMONIA: CPT | Performed by: EMERGENCY MEDICINE

## 2021-01-01 PROCEDURE — 36415 COLL VENOUS BLD VENIPUNCTURE: CPT | Performed by: EMERGENCY MEDICINE

## 2021-01-01 PROCEDURE — 82550 ASSAY OF CK (CPK): CPT | Performed by: INTERNAL MEDICINE

## 2021-01-01 PROCEDURE — 999N000015 HC STATISTIC ARTERIAL MONITORING DAILY

## 2021-01-01 PROCEDURE — 999N000128 HC STATISTIC PERIPHERAL IV START W/O US GUIDANCE

## 2021-01-01 PROCEDURE — 82810 BLOOD GASES O2 SAT ONLY: CPT | Performed by: STUDENT IN AN ORGANIZED HEALTH CARE EDUCATION/TRAINING PROGRAM

## 2021-01-01 PROCEDURE — 250N000013 HC RX MED GY IP 250 OP 250 PS 637: Performed by: STUDENT IN AN ORGANIZED HEALTH CARE EDUCATION/TRAINING PROGRAM

## 2021-01-01 PROCEDURE — 85730 THROMBOPLASTIN TIME PARTIAL: CPT | Performed by: EMERGENCY MEDICINE

## 2021-01-01 PROCEDURE — 99207 PR NO BILLABLE SERVICE THIS VISIT: CPT | Performed by: STUDENT IN AN ORGANIZED HEALTH CARE EDUCATION/TRAINING PROGRAM

## 2021-01-01 PROCEDURE — P9047 ALBUMIN (HUMAN), 25%, 50ML: HCPCS | Performed by: STUDENT IN AN ORGANIZED HEALTH CARE EDUCATION/TRAINING PROGRAM

## 2021-01-01 PROCEDURE — 99285 EMERGENCY DEPT VISIT HI MDM: CPT | Mod: 25

## 2021-01-01 PROCEDURE — 258N000003 HC RX IP 258 OP 636: Performed by: NURSE PRACTITIONER

## 2021-01-01 PROCEDURE — 99291 CRITICAL CARE FIRST HOUR: CPT | Mod: 25 | Performed by: STUDENT IN AN ORGANIZED HEALTH CARE EDUCATION/TRAINING PROGRAM

## 2021-01-01 PROCEDURE — 85014 HEMATOCRIT: CPT | Performed by: NURSE PRACTITIONER

## 2021-01-01 PROCEDURE — 85027 COMPLETE CBC AUTOMATED: CPT | Performed by: STUDENT IN AN ORGANIZED HEALTH CARE EDUCATION/TRAINING PROGRAM

## 2021-01-01 PROCEDURE — 250N000009 HC RX 250: Performed by: NURSE PRACTITIONER

## 2021-01-01 PROCEDURE — 3E043XZ INTRODUCTION OF VASOPRESSOR INTO CENTRAL VEIN, PERCUTANEOUS APPROACH: ICD-10-PCS | Performed by: EMERGENCY MEDICINE

## 2021-01-01 PROCEDURE — 94003 VENT MGMT INPAT SUBQ DAY: CPT

## 2021-01-01 PROCEDURE — 83605 ASSAY OF LACTIC ACID: CPT

## 2021-01-01 PROCEDURE — 85060 BLOOD SMEAR INTERPRETATION: CPT | Performed by: PATHOLOGY

## 2021-01-01 PROCEDURE — 93975 VASCULAR STUDY: CPT | Mod: 26 | Performed by: RADIOLOGY

## 2021-01-01 PROCEDURE — 74018 RADEX ABDOMEN 1 VIEW: CPT | Mod: 26 | Performed by: RADIOLOGY

## 2021-01-01 PROCEDURE — 86901 BLOOD TYPING SEROLOGIC RH(D): CPT | Performed by: EMERGENCY MEDICINE

## 2021-01-01 PROCEDURE — 87040 BLOOD CULTURE FOR BACTERIA: CPT | Performed by: STUDENT IN AN ORGANIZED HEALTH CARE EDUCATION/TRAINING PROGRAM

## 2021-01-01 PROCEDURE — 87641 MR-STAPH DNA AMP PROBE: CPT | Performed by: STUDENT IN AN ORGANIZED HEALTH CARE EDUCATION/TRAINING PROGRAM

## 2021-01-01 PROCEDURE — 99223 1ST HOSP IP/OBS HIGH 75: CPT | Performed by: STUDENT IN AN ORGANIZED HEALTH CARE EDUCATION/TRAINING PROGRAM

## 2021-01-01 RX ORDER — LORAZEPAM 2 MG/ML
0.5 INJECTION INTRAMUSCULAR ONCE
Status: COMPLETED | OUTPATIENT
Start: 2021-01-01 | End: 2021-01-01

## 2021-01-01 RX ORDER — POTASSIUM CHLORIDE 750 MG/1
40 TABLET, EXTENDED RELEASE ORAL ONCE
Status: DISCONTINUED | OUTPATIENT
Start: 2021-01-01 | End: 2021-01-01

## 2021-01-01 RX ORDER — POTASSIUM CHLORIDE 750 MG/1
20 TABLET, EXTENDED RELEASE ORAL ONCE
Status: DISCONTINUED | OUTPATIENT
Start: 2021-01-01 | End: 2021-01-01

## 2021-01-01 RX ORDER — FENTANYL CITRATE 50 UG/ML
50 INJECTION, SOLUTION INTRAMUSCULAR; INTRAVENOUS ONCE
Status: DISCONTINUED | OUTPATIENT
Start: 2021-01-01 | End: 2021-01-01 | Stop reason: HOSPADM

## 2021-01-01 RX ORDER — ALBUTEROL SULFATE 0.83 MG/ML
2.5 SOLUTION RESPIRATORY (INHALATION)
Status: DISCONTINUED | OUTPATIENT
Start: 2021-01-01 | End: 2021-01-01 | Stop reason: HOSPADM

## 2021-01-01 RX ORDER — FENTANYL CITRATE 50 UG/ML
50 INJECTION, SOLUTION INTRAMUSCULAR; INTRAVENOUS EVERY 10 MIN PRN
Status: DISCONTINUED | OUTPATIENT
Start: 2021-01-01 | End: 2021-01-01 | Stop reason: HOSPADM

## 2021-01-01 RX ORDER — CALCIUM CHLORIDE, MAGNESIUM CHLORIDE, SODIUM CHLORIDE, SODIUM BICARBONATE, POTASSIUM CHLORIDE AND SODIUM PHOSPHATE DIBASIC DIHYDRATE 3.68; 3.05; 6.34; 3.09; .314; .187 G/L; G/L; G/L; G/L; G/L; G/L
12.5 INJECTION INTRAVENOUS CONTINUOUS
Status: DISCONTINUED | OUTPATIENT
Start: 2021-01-01 | End: 2021-01-01 | Stop reason: HOSPADM

## 2021-01-01 RX ORDER — NALOXONE HYDROCHLORIDE 0.4 MG/ML
0.2 INJECTION, SOLUTION INTRAMUSCULAR; INTRAVENOUS; SUBCUTANEOUS
Status: DISCONTINUED | OUTPATIENT
Start: 2021-01-01 | End: 2021-01-01 | Stop reason: HOSPADM

## 2021-01-01 RX ORDER — LANOLIN ALCOHOL/MO/W.PET/CERES
100 CREAM (GRAM) TOPICAL DAILY
Status: DISCONTINUED | OUTPATIENT
Start: 2021-10-21 | End: 2021-01-01 | Stop reason: HOSPADM

## 2021-01-01 RX ORDER — DEXTROSE MONOHYDRATE 25 G/50ML
25-50 INJECTION, SOLUTION INTRAVENOUS
Status: DISCONTINUED | OUTPATIENT
Start: 2021-01-01 | End: 2021-01-01 | Stop reason: HOSPADM

## 2021-01-01 RX ORDER — LACTULOSE 10 G/15ML
30 SOLUTION ORAL ONCE
Status: COMPLETED | OUTPATIENT
Start: 2021-01-01 | End: 2021-01-01

## 2021-01-01 RX ORDER — MAGNESIUM SULFATE HEPTAHYDRATE 40 MG/ML
2 INJECTION, SOLUTION INTRAVENOUS ONCE
Status: COMPLETED | OUTPATIENT
Start: 2021-01-01 | End: 2021-01-01

## 2021-01-01 RX ORDER — PIPERACILLIN SODIUM, TAZOBACTAM SODIUM 4; .5 G/20ML; G/20ML
4.5 INJECTION, POWDER, LYOPHILIZED, FOR SOLUTION INTRAVENOUS EVERY 6 HOURS
Status: DISCONTINUED | OUTPATIENT
Start: 2021-01-01 | End: 2021-01-01 | Stop reason: HOSPADM

## 2021-01-01 RX ORDER — DEXMEDETOMIDINE HYDROCHLORIDE 4 UG/ML
.2-1.5 INJECTION, SOLUTION INTRAVENOUS CONTINUOUS
Status: DISCONTINUED | OUTPATIENT
Start: 2021-01-01 | End: 2021-01-01

## 2021-01-01 RX ORDER — PANTOPRAZOLE SODIUM 40 MG/1
40 TABLET, DELAYED RELEASE ORAL
Status: DISCONTINUED | OUTPATIENT
Start: 2021-01-01 | End: 2021-01-01

## 2021-01-01 RX ORDER — LANOLIN ALCOHOL/MO/W.PET/CERES
100 CREAM (GRAM) TOPICAL DAILY
Status: DISCONTINUED | OUTPATIENT
Start: 2021-01-01 | End: 2021-01-01

## 2021-01-01 RX ORDER — NOREPINEPHRINE BITARTRATE 0.06 MG/ML
.01-.6 INJECTION, SOLUTION INTRAVENOUS CONTINUOUS
Status: DISCONTINUED | OUTPATIENT
Start: 2021-01-01 | End: 2021-01-01 | Stop reason: HOSPADM

## 2021-01-01 RX ORDER — POTASSIUM CHLORIDE 7.45 MG/ML
10 INJECTION INTRAVENOUS ONCE
Status: COMPLETED | OUTPATIENT
Start: 2021-01-01 | End: 2021-01-01

## 2021-01-01 RX ORDER — ALBUMIN, HUMAN INJ 5% 5 %
25 SOLUTION INTRAVENOUS ONCE
Status: COMPLETED | OUTPATIENT
Start: 2021-01-01 | End: 2021-01-01

## 2021-01-01 RX ORDER — MIDAZOLAM HCL IN 0.9 % NACL/PF 1 MG/ML
1-8 PLASTIC BAG, INJECTION (ML) INTRAVENOUS CONTINUOUS
Status: DISCONTINUED | OUTPATIENT
Start: 2021-01-01 | End: 2021-01-01

## 2021-01-01 RX ORDER — LACTULOSE 10 G/15ML
100 SOLUTION ORAL
Status: DISCONTINUED | OUTPATIENT
Start: 2021-01-01 | End: 2021-01-01 | Stop reason: HOSPADM

## 2021-01-01 RX ORDER — ONDANSETRON 4 MG/1
4 TABLET, ORALLY DISINTEGRATING ORAL EVERY 6 HOURS PRN
Status: DISCONTINUED | OUTPATIENT
Start: 2021-01-01 | End: 2021-01-01 | Stop reason: HOSPADM

## 2021-01-01 RX ORDER — CALCIUM GLUCONATE 20 MG/ML
2 INJECTION, SOLUTION INTRAVENOUS EVERY 8 HOURS PRN
Status: DISCONTINUED | OUTPATIENT
Start: 2021-01-01 | End: 2021-01-01 | Stop reason: HOSPADM

## 2021-01-01 RX ORDER — POTASSIUM CHLORIDE 29.8 MG/ML
20 INJECTION INTRAVENOUS
Status: COMPLETED | OUTPATIENT
Start: 2021-01-01 | End: 2021-01-01

## 2021-01-01 RX ORDER — ONDANSETRON 2 MG/ML
4 INJECTION INTRAMUSCULAR; INTRAVENOUS EVERY 6 HOURS PRN
Status: DISCONTINUED | OUTPATIENT
Start: 2021-01-01 | End: 2021-01-01 | Stop reason: HOSPADM

## 2021-01-01 RX ORDER — FENTANYL CITRATE 50 UG/ML
50 INJECTION, SOLUTION INTRAMUSCULAR; INTRAVENOUS EVERY 30 MIN PRN
Status: DISCONTINUED | OUTPATIENT
Start: 2021-01-01 | End: 2021-01-01 | Stop reason: HOSPADM

## 2021-01-01 RX ORDER — NALOXONE HYDROCHLORIDE 0.4 MG/ML
0.4 INJECTION, SOLUTION INTRAMUSCULAR; INTRAVENOUS; SUBCUTANEOUS
Status: DISCONTINUED | OUTPATIENT
Start: 2021-01-01 | End: 2021-01-01 | Stop reason: HOSPADM

## 2021-01-01 RX ORDER — LACTULOSE 10 G/15ML
20 SOLUTION ORAL
Status: DISCONTINUED | OUTPATIENT
Start: 2021-01-01 | End: 2021-01-01 | Stop reason: HOSPADM

## 2021-01-01 RX ORDER — ALBUMIN (HUMAN) 12.5 G/50ML
25 SOLUTION INTRAVENOUS ONCE
Status: COMPLETED | OUTPATIENT
Start: 2021-01-01 | End: 2021-01-01

## 2021-01-01 RX ORDER — FOLIC ACID 1 MG/1
1 TABLET ORAL DAILY
Status: DISCONTINUED | OUTPATIENT
Start: 2021-01-01 | End: 2021-01-01 | Stop reason: HOSPADM

## 2021-01-01 RX ORDER — PROPOFOL 10 MG/ML
5-75 INJECTION, EMULSION INTRAVENOUS CONTINUOUS
Status: DISCONTINUED | OUTPATIENT
Start: 2021-01-01 | End: 2021-01-01 | Stop reason: HOSPADM

## 2021-01-01 RX ORDER — DEXTROSE MONOHYDRATE 25 G/50ML
50 INJECTION, SOLUTION INTRAVENOUS ONCE
Status: COMPLETED | OUTPATIENT
Start: 2021-01-01 | End: 2021-01-01

## 2021-01-01 RX ORDER — MAGNESIUM SULFATE HEPTAHYDRATE 40 MG/ML
2 INJECTION, SOLUTION INTRAVENOUS EVERY 8 HOURS PRN
Status: DISCONTINUED | OUTPATIENT
Start: 2021-01-01 | End: 2021-01-01 | Stop reason: HOSPADM

## 2021-01-01 RX ORDER — CALCIUM GLUCONATE 20 MG/ML
2 INJECTION, SOLUTION INTRAVENOUS ONCE
Status: DISCONTINUED | OUTPATIENT
Start: 2021-01-01 | End: 2021-01-01 | Stop reason: HOSPADM

## 2021-01-01 RX ORDER — NICOTINE POLACRILEX 4 MG
15-30 LOZENGE BUCCAL
Status: DISCONTINUED | OUTPATIENT
Start: 2021-01-01 | End: 2021-01-01 | Stop reason: HOSPADM

## 2021-01-01 RX ORDER — DEXTROSE MONOHYDRATE 100 MG/ML
INJECTION, SOLUTION INTRAVENOUS CONTINUOUS
Status: DISCONTINUED | OUTPATIENT
Start: 2021-01-01 | End: 2021-01-01 | Stop reason: HOSPADM

## 2021-01-01 RX ORDER — GLYCOPYRROLATE 0.2 MG/ML
0.2 INJECTION, SOLUTION INTRAMUSCULAR; INTRAVENOUS ONCE
Status: DISCONTINUED | OUTPATIENT
Start: 2021-01-01 | End: 2021-01-01 | Stop reason: HOSPADM

## 2021-01-01 RX ORDER — PHENYLEPHRINE HCL IN 0.9% NACL 50MG/250ML
.1-6 PLASTIC BAG, INJECTION (ML) INTRAVENOUS CONTINUOUS
Status: DISCONTINUED | OUTPATIENT
Start: 2021-01-01 | End: 2021-01-01 | Stop reason: HOSPADM

## 2021-01-01 RX ORDER — ETOMIDATE 2 MG/ML
INJECTION INTRAVENOUS PRN
Status: DISCONTINUED | OUTPATIENT
Start: 2021-01-01 | End: 2021-01-01

## 2021-01-01 RX ORDER — PIPERACILLIN SODIUM, TAZOBACTAM SODIUM 3; .375 G/15ML; G/15ML
3.38 INJECTION, POWDER, LYOPHILIZED, FOR SOLUTION INTRAVENOUS ONCE
Status: COMPLETED | OUTPATIENT
Start: 2021-01-01 | End: 2021-01-01

## 2021-01-01 RX ORDER — FOLIC ACID 5 MG/ML
1 INJECTION, SOLUTION INTRAMUSCULAR; INTRAVENOUS; SUBCUTANEOUS DAILY
Status: DISCONTINUED | OUTPATIENT
Start: 2021-01-01 | End: 2021-01-01 | Stop reason: HOSPADM

## 2021-01-01 RX ORDER — PROPOFOL 10 MG/ML
5-75 INJECTION, EMULSION INTRAVENOUS CONTINUOUS
Status: DISCONTINUED | OUTPATIENT
Start: 2021-01-01 | End: 2021-01-01

## 2021-01-01 RX ORDER — THIAMINE HYDROCHLORIDE 100 MG/ML
100 INJECTION, SOLUTION INTRAMUSCULAR; INTRAVENOUS DAILY
Status: DISCONTINUED | OUTPATIENT
Start: 2021-01-01 | End: 2021-01-01

## 2021-01-01 RX ORDER — POTASSIUM CHLORIDE 29.8 MG/ML
20 INJECTION INTRAVENOUS EVERY 8 HOURS PRN
Status: DISCONTINUED | OUTPATIENT
Start: 2021-01-01 | End: 2021-01-01 | Stop reason: HOSPADM

## 2021-01-01 RX ORDER — LIDOCAINE 40 MG/G
CREAM TOPICAL
Status: DISCONTINUED | OUTPATIENT
Start: 2021-01-01 | End: 2021-01-01 | Stop reason: HOSPADM

## 2021-01-01 RX ADMIN — THIAMINE HYDROCHLORIDE 500 MG: 100 INJECTION, SOLUTION INTRAMUSCULAR; INTRAVENOUS at 07:40

## 2021-01-01 RX ADMIN — MAGNESIUM SULFATE HEPTAHYDRATE 2 G: 40 INJECTION, SOLUTION INTRAVENOUS at 10:31

## 2021-01-01 RX ADMIN — PROPOFOL 55 MCG/KG/MIN: 10 INJECTION, EMULSION INTRAVENOUS at 17:23

## 2021-01-01 RX ADMIN — ALBUMIN HUMAN 25 G: 0.05 INJECTION, SOLUTION INTRAVENOUS at 06:20

## 2021-01-01 RX ADMIN — SODIUM BICARBONATE: 84 INJECTION, SOLUTION INTRAVENOUS at 02:24

## 2021-01-01 RX ADMIN — Medication 15 MMOL: at 03:55

## 2021-01-01 RX ADMIN — PROPOFOL 55 MCG/KG/MIN: 10 INJECTION, EMULSION INTRAVENOUS at 21:51

## 2021-01-01 RX ADMIN — LORAZEPAM 0.5 MG: 2 INJECTION INTRAMUSCULAR; INTRAVENOUS at 20:19

## 2021-01-01 RX ADMIN — VANCOMYCIN HYDROCHLORIDE 1500 MG: 5 INJECTION, POWDER, LYOPHILIZED, FOR SOLUTION INTRAVENOUS at 20:08

## 2021-01-01 RX ADMIN — SODIUM BICARBONATE: 84 INJECTION, SOLUTION INTRAVENOUS at 09:40

## 2021-01-01 RX ADMIN — Medication 0.5 MCG/KG/MIN: at 09:59

## 2021-01-01 RX ADMIN — PROPOFOL 55 MCG/KG/MIN: 10 INJECTION, EMULSION INTRAVENOUS at 02:27

## 2021-01-01 RX ADMIN — HYDROCORTISONE SODIUM SUCCINATE 50 MG: 100 INJECTION, POWDER, FOR SOLUTION INTRAMUSCULAR; INTRAVENOUS at 07:40

## 2021-01-01 RX ADMIN — POTASSIUM CHLORIDE 20 MEQ: 29.8 INJECTION, SOLUTION INTRAVENOUS at 12:36

## 2021-01-01 RX ADMIN — THIAMINE HYDROCHLORIDE 500 MG: 100 INJECTION, SOLUTION INTRAMUSCULAR; INTRAVENOUS at 19:44

## 2021-01-01 RX ADMIN — PIPERACILLIN SODIUM AND TAZOBACTAM SODIUM 4.5 G: 4; .5 INJECTION, POWDER, LYOPHILIZED, FOR SOLUTION INTRAVENOUS at 07:40

## 2021-01-01 RX ADMIN — SODIUM CHLORIDE 1000 ML: 9 INJECTION, SOLUTION INTRAVENOUS at 20:17

## 2021-01-01 RX ADMIN — MAGNESIUM SULFATE IN WATER 2 G: 40 INJECTION, SOLUTION INTRAVENOUS at 15:31

## 2021-01-01 RX ADMIN — Medication: at 23:10

## 2021-01-01 RX ADMIN — CALCIUM CHLORIDE, MAGNESIUM CHLORIDE, SODIUM CHLORIDE, SODIUM BICARBONATE, POTASSIUM CHLORIDE AND SODIUM PHOSPHATE DIBASIC DIHYDRATE 12.5 ML/KG/HR: 3.68; 3.05; 6.34; 3.09; .314; .187 INJECTION INTRAVENOUS at 21:14

## 2021-01-01 RX ADMIN — HYDROCORTISONE SODIUM SUCCINATE 50 MG: 100 INJECTION, POWDER, FOR SOLUTION INTRAMUSCULAR; INTRAVENOUS at 02:04

## 2021-01-01 RX ADMIN — POTASSIUM CHLORIDE 20 MEQ: 29.8 INJECTION, SOLUTION INTRAVENOUS at 05:55

## 2021-01-01 RX ADMIN — POTASSIUM CHLORIDE 20 MEQ: 29.8 INJECTION, SOLUTION INTRAVENOUS at 07:53

## 2021-01-01 RX ADMIN — PHYTONADIONE 10 MG: 10 INJECTION, EMULSION INTRAMUSCULAR; INTRAVENOUS; SUBCUTANEOUS at 21:14

## 2021-01-01 RX ADMIN — Medication 80 MG: at 09:15

## 2021-01-01 RX ADMIN — SODIUM BICARBONATE 50 MEQ: 84 INJECTION INTRAVENOUS at 05:29

## 2021-01-01 RX ADMIN — POTASSIUM CHLORIDE 20 MEQ: 29.8 INJECTION, SOLUTION INTRAVENOUS at 15:29

## 2021-01-01 RX ADMIN — CALCIUM CHLORIDE, MAGNESIUM CHLORIDE, SODIUM CHLORIDE, SODIUM BICARBONATE, POTASSIUM CHLORIDE AND SODIUM PHOSPHATE DIBASIC DIHYDRATE 12.5 ML/KG/HR: 3.68; 3.05; 6.34; 3.09; .314; .187 INJECTION INTRAVENOUS at 03:28

## 2021-01-01 RX ADMIN — SODIUM BICARBONATE: 84 INJECTION, SOLUTION INTRAVENOUS at 11:46

## 2021-01-01 RX ADMIN — MIDAZOLAM 2 MG: 1 INJECTION INTRAMUSCULAR; INTRAVENOUS at 09:55

## 2021-01-01 RX ADMIN — VASOPRESSIN 4 UNITS/HR: 20 INJECTION INTRAVENOUS at 07:54

## 2021-01-01 RX ADMIN — Medication 0.6 MCG/KG/MIN: at 10:52

## 2021-01-01 RX ADMIN — DEXTROSE MONOHYDRATE: 100 INJECTION, SOLUTION INTRAVENOUS at 09:13

## 2021-01-01 RX ADMIN — DEXTROSE MONOHYDRATE 50 ML: 500 INJECTION PARENTERAL at 21:14

## 2021-01-01 RX ADMIN — NOREPINEPHRINE BITARTRATE 0.18 MCG/KG/MIN: 1 INJECTION, SOLUTION, CONCENTRATE INTRAVENOUS at 13:53

## 2021-01-01 RX ADMIN — SODIUM BICARBONATE 100 MEQ: 84 INJECTION INTRAVENOUS at 08:59

## 2021-01-01 RX ADMIN — Medication 20 NG/KG/MIN: at 08:40

## 2021-01-01 RX ADMIN — PANTOPRAZOLE SODIUM 40 MG: 40 INJECTION, POWDER, FOR SOLUTION INTRAVENOUS at 19:43

## 2021-01-01 RX ADMIN — CALCIUM GLUCONATE 2 G: 20 INJECTION, SOLUTION INTRAVENOUS at 03:12

## 2021-01-01 RX ADMIN — SODIUM BICARBONATE: 84 INJECTION, SOLUTION INTRAVENOUS at 22:16

## 2021-01-01 RX ADMIN — DEXTROSE 3 MCG/KG/MIN: 50 INJECTION, SOLUTION INTRAVENOUS at 15:49

## 2021-01-01 RX ADMIN — CALCIUM CHLORIDE, MAGNESIUM CHLORIDE, SODIUM CHLORIDE, SODIUM BICARBONATE, POTASSIUM CHLORIDE AND SODIUM PHOSPHATE DIBASIC DIHYDRATE 12.5 ML/KG/HR: 3.68; 3.05; 6.34; 3.09; .314; .187 INJECTION INTRAVENOUS at 10:03

## 2021-01-01 RX ADMIN — CALCIUM GLUCONATE 2 G: 20 INJECTION, SOLUTION INTRAVENOUS at 12:54

## 2021-01-01 RX ADMIN — POTASSIUM CHLORIDE 20 MEQ: 29.8 INJECTION, SOLUTION INTRAVENOUS at 07:02

## 2021-01-01 RX ADMIN — PIPERACILLIN SODIUM AND TAZOBACTAM SODIUM 4.5 G: 4; .5 INJECTION, POWDER, LYOPHILIZED, FOR SOLUTION INTRAVENOUS at 14:16

## 2021-01-01 RX ADMIN — THIAMINE HYDROCHLORIDE 500 MG: 100 INJECTION, SOLUTION INTRAMUSCULAR; INTRAVENOUS at 10:31

## 2021-01-01 RX ADMIN — DEXTROSE MONOHYDRATE: 100 INJECTION, SOLUTION INTRAVENOUS at 04:50

## 2021-01-01 RX ADMIN — CALCIUM CHLORIDE 1 G: 100 INJECTION, SOLUTION INTRAVENOUS at 10:30

## 2021-01-01 RX ADMIN — VANCOMYCIN HYDROCHLORIDE 1250 MG: 100 INJECTION, POWDER, LYOPHILIZED, FOR SOLUTION INTRAVENOUS at 10:22

## 2021-01-01 RX ADMIN — Medication 0.08 MCG/KG/MIN: at 15:39

## 2021-01-01 RX ADMIN — THIAMINE HYDROCHLORIDE 500 MG: 100 INJECTION, SOLUTION INTRAMUSCULAR; INTRAVENOUS at 14:14

## 2021-01-01 RX ADMIN — DEXTROSE MONOHYDRATE 50 ML: 500 INJECTION PARENTERAL at 05:42

## 2021-01-01 RX ADMIN — POTASSIUM CHLORIDE 10 MEQ: 7.46 INJECTION, SOLUTION INTRAVENOUS at 21:45

## 2021-01-01 RX ADMIN — POTASSIUM PHOSPHATE, MONOBASIC AND POTASSIUM PHOSPHATE, DIBASIC 15 MMOL: 224; 236 INJECTION, SOLUTION INTRAVENOUS at 17:55

## 2021-01-01 RX ADMIN — ALBUMIN HUMAN 25 G: 0.25 SOLUTION INTRAVENOUS at 04:38

## 2021-01-01 RX ADMIN — CALCIUM GLUCONATE 2 G: 20 INJECTION, SOLUTION INTRAVENOUS at 17:02

## 2021-01-01 RX ADMIN — CALCIUM CHLORIDE, MAGNESIUM CHLORIDE, SODIUM CHLORIDE, SODIUM BICARBONATE, POTASSIUM CHLORIDE AND SODIUM PHOSPHATE DIBASIC DIHYDRATE 12.5 ML/KG/HR: 3.68; 3.05; 6.34; 3.09; .314; .187 INJECTION INTRAVENOUS at 16:13

## 2021-01-01 RX ADMIN — DEXMEDETOMIDINE 1.5 MCG/KG/HR: 100 INJECTION, SOLUTION, CONCENTRATE INTRAVENOUS at 13:36

## 2021-01-01 RX ADMIN — Medication: at 15:34

## 2021-01-01 RX ADMIN — PIPERACILLIN SODIUM AND TAZOBACTAM SODIUM 3.38 G: 3; .375 INJECTION, POWDER, LYOPHILIZED, FOR SOLUTION INTRAVENOUS at 20:08

## 2021-01-01 RX ADMIN — SODIUM CHLORIDE, POTASSIUM CHLORIDE, SODIUM LACTATE AND CALCIUM CHLORIDE 1000 ML: 600; 310; 30; 20 INJECTION, SOLUTION INTRAVENOUS at 05:33

## 2021-01-01 RX ADMIN — Medication 100 MCG/HR: at 08:46

## 2021-01-01 RX ADMIN — LIDOCAINE HYDROCHLORIDE 3 ML: 10 INJECTION, SOLUTION EPIDURAL; INFILTRATION; INTRACAUDAL; PERINEURAL at 20:40

## 2021-01-01 RX ADMIN — Medication: at 07:43

## 2021-01-01 RX ADMIN — VASOPRESSIN 2.4 UNITS/HR: 20 INJECTION INTRAVENOUS at 13:43

## 2021-01-01 RX ADMIN — PROPOFOL 50 MCG/KG/MIN: 10 INJECTION, EMULSION INTRAVENOUS at 14:02

## 2021-01-01 RX ADMIN — PIPERACILLIN SODIUM AND TAZOBACTAM SODIUM 4.5 G: 4; .5 INJECTION, POWDER, LYOPHILIZED, FOR SOLUTION INTRAVENOUS at 05:43

## 2021-01-01 RX ADMIN — CALCIUM CHLORIDE, MAGNESIUM CHLORIDE, SODIUM CHLORIDE, SODIUM BICARBONATE, POTASSIUM CHLORIDE AND SODIUM PHOSPHATE DIBASIC DIHYDRATE 12.5 ML/KG/HR: 3.68; 3.05; 6.34; 3.09; .314; .187 INJECTION INTRAVENOUS at 09:58

## 2021-01-01 RX ADMIN — THIAMINE HYDROCHLORIDE 100 MG: 100 INJECTION, SOLUTION INTRAMUSCULAR; INTRAVENOUS at 08:05

## 2021-01-01 RX ADMIN — ACETYLCYSTEINE 6.25 MG/KG/HR: 200 INJECTION, SOLUTION INTRAVENOUS at 08:48

## 2021-01-01 RX ADMIN — PIPERACILLIN SODIUM AND TAZOBACTAM SODIUM 4.5 G: 4; .5 INJECTION, POWDER, LYOPHILIZED, FOR SOLUTION INTRAVENOUS at 02:00

## 2021-01-01 RX ADMIN — Medication 50 MEQ: at 05:29

## 2021-01-01 RX ADMIN — DEXTROSE MONOHYDRATE 50 ML: 500 INJECTION PARENTERAL at 04:33

## 2021-01-01 RX ADMIN — Medication 100 MCG: at 09:50

## 2021-01-01 RX ADMIN — Medication 100 MCG/HR: at 09:55

## 2021-01-01 RX ADMIN — HYDROCORTISONE SODIUM SUCCINATE 50 MG: 100 INJECTION, POWDER, FOR SOLUTION INTRAMUSCULAR; INTRAVENOUS at 14:05

## 2021-01-01 RX ADMIN — FOLIC ACID 1 MG: 5 INJECTION, SOLUTION INTRAMUSCULAR; INTRAVENOUS; SUBCUTANEOUS at 08:01

## 2021-01-01 RX ADMIN — LACTULOSE 30 G: 10 SOLUTION ORAL at 23:02

## 2021-01-01 RX ADMIN — PIPERACILLIN SODIUM AND TAZOBACTAM SODIUM 4.5 G: 4; .5 INJECTION, POWDER, LYOPHILIZED, FOR SOLUTION INTRAVENOUS at 19:46

## 2021-01-01 RX ADMIN — MICAFUNGIN SODIUM 100 MG: 50 INJECTION, POWDER, LYOPHILIZED, FOR SOLUTION INTRAVENOUS at 08:41

## 2021-01-01 RX ADMIN — VASOPRESSIN 2.4 UNITS/HR: 20 INJECTION INTRAVENOUS at 21:19

## 2021-01-01 RX ADMIN — Medication 0.6 MCG/KG/MIN: at 04:51

## 2021-01-01 RX ADMIN — PROPOFOL 55 MCG/KG/MIN: 10 INJECTION, EMULSION INTRAVENOUS at 10:15

## 2021-01-01 RX ADMIN — DEXTROSE 3 MCG/KG/MIN: 50 INJECTION, SOLUTION INTRAVENOUS at 04:34

## 2021-01-01 RX ADMIN — Medication 50 MCG: at 10:28

## 2021-01-01 RX ADMIN — VANCOMYCIN HYDROCHLORIDE 1500 MG: 100 INJECTION, POWDER, LYOPHILIZED, FOR SOLUTION INTRAVENOUS at 10:25

## 2021-01-01 RX ADMIN — ETOMIDATE 20 MG: 40 INJECTION, SOLUTION INTRAVENOUS at 09:15

## 2021-01-01 RX ADMIN — PROPOFOL 10 MCG/KG/MIN: 10 INJECTION, EMULSION INTRAVENOUS at 12:20

## 2021-01-01 RX ADMIN — PROPOFOL 55 MCG/KG/MIN: 10 INJECTION, EMULSION INTRAVENOUS at 05:48

## 2021-01-01 RX ADMIN — SODIUM CHLORIDE 250 ML: 9 INJECTION, SOLUTION INTRAVENOUS at 00:29

## 2021-01-01 RX ADMIN — PANTOPRAZOLE SODIUM 40 MG: 40 INJECTION, POWDER, FOR SOLUTION INTRAVENOUS at 08:05

## 2021-01-01 RX ADMIN — HYDROCORTISONE SODIUM SUCCINATE 50 MG: 100 INJECTION, POWDER, FOR SOLUTION INTRAMUSCULAR; INTRAVENOUS at 19:43

## 2021-01-01 RX ADMIN — CALCIUM CHLORIDE, MAGNESIUM CHLORIDE, SODIUM CHLORIDE, SODIUM BICARBONATE, POTASSIUM CHLORIDE AND SODIUM PHOSPHATE DIBASIC DIHYDRATE 12.5 ML/KG/HR: 3.68; 3.05; 6.34; 3.09; .314; .187 INJECTION INTRAVENOUS at 16:07

## 2021-01-01 RX ADMIN — PANTOPRAZOLE SODIUM 40 MG: 40 INJECTION, POWDER, FOR SOLUTION INTRAVENOUS at 07:40

## 2021-01-01 ASSESSMENT — ACTIVITIES OF DAILY LIVING (ADL)
ADLS_ACUITY_SCORE: 16
ADLS_ACUITY_SCORE: 16
ADLS_ACUITY_SCORE: 18
ADLS_ACUITY_SCORE: 16
ADLS_ACUITY_SCORE: 16
ADLS_ACUITY_SCORE: 14

## 2021-06-16 PROBLEM — F10.930 ALCOHOL WITHDRAWAL, UNCOMPLICATED (H): Status: ACTIVE | Noted: 2020-02-02

## 2021-06-16 PROBLEM — F10.930 ALCOHOL WITHDRAWAL, UNCOMPLICATED (H): Status: ACTIVE | Noted: 2020-05-24

## 2021-06-16 PROBLEM — K70.10 ALCOHOLIC HEPATITIS WITHOUT ASCITES (H): Status: ACTIVE | Noted: 2020-05-24

## 2021-06-16 PROBLEM — D69.6 THROMBOCYTOPENIA (H): Status: ACTIVE | Noted: 2020-05-24

## 2021-10-13 PROBLEM — K72.00 ACUTE LIVER FAILURE: Status: ACTIVE | Noted: 2021-01-01

## 2021-10-13 NOTE — ED NOTES
Bed: JNEDH-01  Expected date: 10/13/21  Expected time: 6:15 PM  Means of arrival: Ambulance  Comments:  [facility] Coming from [facility], Pt is Septic

## 2021-10-13 NOTE — ED TRIAGE NOTES
Patient arrives via [facility] for possible sepsis that was seen by UR today. Lactic >12. Patient alert to only self. Very jaundice.

## 2021-10-13 NOTE — ED PROVIDER NOTES
EMERGENCY DEPARTMENT ENCOUNTER      NAME: Shaniqua May  AGE: 46 year old female  YOB: 1975  MRN: [id number]  EVALUATION DATE & TIME: 10/13/2021  6:19 PM    PCP: Sheila Morin    ED PROVIDER: Sheila Erickson M.D.        Chief Complaint   Patient presents with     Abnormal Labs         FINAL IMPRESSION:    1. Acute liver failure without hepatic coma    2. Elevated lactic acid level    3. Elevated liver enzymes    4. Acute pancreatitis, unspecified complication status, unspecified pancreatitis type    5. Hypokalemia    6. Prolonged Q-T interval on ECG    7. Hyperammonemia (H)    8. Hepatic encephalopathy (H)    9. Alcohol abuse            MEDICAL DECISION MAKIN year old female history of alcohol abuse who presents emergency department from the urgency room with signs of acute liver failure and elevated lactic acid.  No signs for infection at this time though patient certainly high risk.  Antibiotics initiated for possible sepsis though elevated lactic acid may just be due to liver failure.  Blood pressure stable at this time.  Patient is sinus tachycardia.  Have given a dose of Ativan to help with possible withdrawal though alcohol still positive.  Patient accepted to the [facility] stepdown unit as accepted by Dr. Davis.  She will be transferred there by critical care transport.        ED COURSE:  7:02 PM I met with the patient to gather history and perform my exam. ED course and treatment discussed.    7:27 PM Communication with Dr. Paredes from the Urgency Room, they did not give the patient any antibiotics when she was seen there earlier.    7:36 PM Spoke to lab, patient's lactate level is 21.     7:50 PM Patient's mother consented for a PICC line.     8:02 PM Spoke to Dr. Juan with [facility] regarding the patient's case.  He recommends vitamin K 10 mg IV x1.  Also recommend starting N-acetylcysteine with a similar dosing as Tylenol overdose.  He agrees with antibiotics  and also suggest possible checking an acute hepatitis panel.    9:10 PM Spoke to Dr. Briceño, intensivist, regarding the patient's case.  He would prefer the patient be admitted to the ICU at the [facility] if possible.    10:57 PM  I spoke with Dr. Rosario Zimmerman with the ICU at the [facility].  They did not feel that she met ICU criteria quite yet but did feel she be a good candidate for the progressive/stepdown bed.  They did have a bed available and I spoke with Dr. Davis who accepts her to that stepdown bed.  They will call us back once the bed is available.    Patient is critically ill.  Elevated lactic acid may just be due to liver failure, but cannot rule out sepsis at this time.  Antibiotics initiated.      COVID-19 PPE worn during patient evaluation:  Mask: n95 and homemade masks   Eye Protection: goggles  Gown: none  Hair cover: yes  Face shield: none  Patient wearing a mask: yes (but frequently removes it)      At the conclusion of the encounter I discussed the results of all of the tests and the disposition. Their questions were answered. The patient (and any family present) acknowledged understanding and were agreeable with the care plan.        60 minutes of critical care time has been spent in direct patient care, laboratory review, review of previous medical records, and in discussion with admitting physician and consultant(s).  None of this time was spent in procedures.          CONSULTANTS:  [facility] - Dr. Phalen  [facility] ICU- Dr. Rosario Zimmerman  [facility] hospitalist - Dr. Davis        MEDICATIONS GIVEN IN THE EMERGENCY:  Medications   lidocaine 1 % 0.1-5 mL (3 mLs Other Given 10/13/21 2040)   lidocaine (LMX4) cream (has no administration in time range)   sodium chloride (PF) 0.9% PF flush 10-40 mL (has no administration in time range)   lactulose (CHRONULAC) solution 30 g (has no administration in time range)   sodium bicarbonate 150 mEq in D5W 1,000 mL infusion (  Intravenous New Bag 10/13/21 2216)   piperacillin-tazobactam (ZOSYN) 3.375 g vial to attach to  mL bag (0 g Intravenous Stopped 10/13/21 2141)   vancomycin (VANCOCIN) 1,500 mg in sodium chloride 0.9 % 250 mL intermittent infusion (0 mg Intravenous Stopped 10/13/21 2228)   LORazepam (ATIVAN) injection 0.5 mg (0.5 mg Intravenous Given 10/13/21 2019)   0.9% sodium chloride BOLUS (0 mLs Intravenous Stopped 10/13/21 2140)   phytonadione 10 mg in sodium chloride 0.9 % 50 mL intermittent infusion (0 mg Intravenous Stopped 10/13/21 2254)   potassium chloride 10 mEq in 100 mL sterile water intermittent infusion (premix) (0 mEq Intravenous Stopped 10/13/21 2254)   dextrose 50 % injection 50 mL (50 mLs Intravenous Given 10/13/21 2114)             NEW PRESCRIPTIONS STARTED AT TODAY'S ER VISIT     Medication List      There are no discharge medications for this visit.             CONDITION:  critical        DISPOSITION:  Transfer to [facility] stepdown bed as accepted by Dr. Davis, hospitalist         =================================================================  =================================================================    HPI    Patient information was obtained from: Triage note and chart review    Use of Intrepreter: N/A      Shaniqua May is a 46 year old female with history of alcohol dependence with uncomplicated withdrawal, alcoholic hepatitis without ascites, and JORDAN who presents to the ER with complaints of abnormal labs.    Per chart review, patient was seen at the [place] [facility]rgency Room prior to arrival today (10/13/21) for evaluation of confusion. Patient's mother reported she went over to the patient's house after not hearing from her for a few days. She found the patient confused, lethargic, weak, bloated, and eyes and skin yellow. Patient reported abdominal bloating and diarrhea as well. Labs were remarkable for a lactate >12.0, and lipase 387.     CT abdomen pelvis: IMPRESSION:   1.  Dense  "hepatic steatosis with trace ascites.  2.  Diffuse subcutaneous edema consistent with elevated fluid status.  3.  Tiny fat-containing paraumbilical hernia.    CT head brain: IMPRESSION:  1.  No CT finding of a mass, hemorrhage or focal area suggestive of acute infarct.     Per triage, patient arrives via EMS for possible sepsis found at urgency room today. Patient had a lactate >12. Triage reports that the patient is alert only to self and appears very jaundice.       REVIEW OF SYSTEMS  Review of Systems   Unable to perform ROS: Mental status change         PAST MEDICAL HISTORY:  Past Medical History:   Diagnosis Date     Alcohol abuse      Lyme disease          PAST SURGICAL HISTORY:  No past surgical history on file.      CURRENT MEDICATIONS:    Prior to Admission medications    Medication Sig Start Date End Date Taking? Authorizing Provider   metoprolol tartrate (LOPRESSOR) 25 MG tablet [METOPROLOL TARTRATE (LOPRESSOR) 25 MG TABLET] Take 0.5 tablets (12.5 mg total) by mouth 2 (two) times a day. 5/20/21 6/19/21  Nick Quintanilla, DO   metoprolol tartrate (LOPRESSOR) 25 MG tablet [METOPROLOL TARTRATE (LOPRESSOR) 25 MG TABLET] Take 0.5 tablets (12.5 mg total) by mouth 2 (two) times a day. 5/27/20   Hao Valadez MD   ondansetron (ZOFRAN-ODT) 4 MG disintegrating tablet [ONDANSETRON (ZOFRAN-ODT) 4 MG DISINTEGRATING TABLET] Take 1 tablet (4 mg total) by mouth every 8 (eight) hours as needed for nausea. 5/20/21   Nick Quintanilla, DO   pantoprazole (PROTONIX) 40 MG tablet [PANTOPRAZOLE (PROTONIX) 40 MG TABLET] Take 1 tablet (40 mg total) by mouth daily. 5/20/21   Nick Quintanilla, DO   UNABLE TO FIND [UNABLE TO FIND] Apply 1 drop to eye as needed (for eye allergy symptoms). Med Name: \"silver\" -  All natural allergy eye drop 5/20/21   Provider, Historical         ALLERGIES:  Allergies   Allergen Reactions     Sulfa (Sulfonamide Antibiotics) [Sulfa Drugs] Other (See Comments)     Patient stated learned during some testing "         FAMILY HISTORY:  No family history on file.      SOCIAL HISTORY:  Social History     Socioeconomic History     Marital status:      Spouse name: Not on file     Number of children: Not on file     Years of education: Not on file     Highest education level: Not on file   Occupational History     Not on file   Tobacco Use     Smoking status: Former Smoker     Types: Cigarettes, Cigarettes     Quit date: 2020     Years since quittin.3     Smokeless tobacco: Never Used   Substance and Sexual Activity     Alcohol use: Yes     Alcohol/week: 10.0 standard drinks     Comment: Alcoholic Drinks/day: hadnt been drinking until recently and within last few months started drinking daily. 1-2 bottle of wine     Drug use: Not Currently     Sexual activity: Not Currently   Other Topics Concern     Not on file   Social History Narrative    ** Merged History Encounter **       Social Determinants of Health     Financial Resource Strain:      Difficulty of Paying Living Expenses:    Food Insecurity:      Worried About Running Out of Food in the Last Year:      Ran Out of Food in the Last Year:    Transportation Needs:      Lack of Transportation (Medical):      Lack of Transportation (Non-Medical):    Physical Activity:      Days of Exercise per Week:      Minutes of Exercise per Session:    Stress:      Feeling of Stress :    Social Connections:      Frequency of Communication with Friends and Family:      Frequency of Social Gatherings with Friends and Family:      Attends [employer] Services:      Active Member of Clubs or Organizations:      Attends Club or Organization Meetings:      Marital Status:    Intimate Partner Violence:      Fear of Current or Ex-Partner:      Emotionally Abused:      Physically Abused:      Sexually Abused:          VITALS:  Patient Vitals for the past 24 hrs:   BP Temp Pulse Resp SpO2 Weight   10/13/21 2215 118/69 -- (!) 126 (!) 31 97 % --   10/13/21 2200 131/78 -- (!) 128 27 97  % --   10/13/21 2145 118/70 -- (!) 129 (!) 31 97 % --   10/13/21 2130 130/77 -- (!) 130 (!) 32 96 % --   10/13/21 2120 126/75 -- (!) 129 (!) 37 96 % --   10/13/21 2100 124/79 -- (!) 124 (!) 31 96 % --   10/13/21 2030 124/77 -- (!) 125 29 96 % --   10/13/21 2000 128/84 -- (!) 130 (!) 31 96 % --   10/13/21 1926 -- -- -- -- -- 69.4 kg (153 lb)   10/13/21 1905 -- 98.3  F (36.8  C) (!) 133 30 97 % --   10/13/21 1830 137/83 -- (!) 127 26 98 % --   10/13/21 1826 135/86 -- (!) 127 16 97 % --       Wt Readings from Last 3 Encounters:   10/13/21 69.4 kg (153 lb)         PHYSICAL EXAM    Constitutional:  Well developed, Well nourished, NAD, GCS 15  HENT:  Normocephalic, Atraumatic, Bilateral external ears normal,  Nose normal. Neck- Normal range of motion, No tenderness, Supple, No stridor.   Eyes:  PERRL, EOMI, scleral icterus, no discharge.  Respiratory:  Normal breath sounds, No respiratory distress, No wheezing, Speaks full sentences easily. No cough.   Cardiovascular:  Tachy heart rate, Regular rhythm,  No murmurs, No rubs, No gallops.   GI:  No excessive obesity.  Bowel sounds normal, Soft, No tenderness, No masses, No flank tenderness. No rebound or guarding.   : deferred  Musculoskeletal: 2+ DP pulses.  No cyanosis, No clubbing. Good range of motion in all major joints. No major deformities noted.   Neurologic:  Alert & oriented to person and place only, Normal motor function, Normal sensory function, CN 2-12 grossly intact  Psychiatric:  Cooperative          LAB:  All pertinent labs reviewed and interpreted.  Recent Results (from the past 24 hour(s))   COVID-19 VIRUS (CORONAVIRUS) BY PCR (EXTERNAL RESULT)    Collection Time: 10/13/21  5:01 PM   Result Value Ref Range    COVID-19 Virus by PCR (External Result) Negative Negative   CBC (+ platelets, no diff)    Collection Time: 10/13/21  7:21 PM   Result Value Ref Range    WBC Count 14.7 (H) 4.0 - 11.0 10e3/uL    RBC Count 2.15 (L) 3.80 - 5.20 10e6/uL    Hemoglobin 7.9  (L) 11.7 - 15.7 g/dL    Hematocrit 25.1 (L) 35.0 - 47.0 %     (H) 78 - 100 fL    MCH 36.7 (H) 26.5 - 33.0 pg    MCHC 31.5 31.5 - 36.5 g/dL    RDW 14.8 10.0 - 15.0 %    Platelet Count 149 (L) 150 - 450 10e3/uL   INR    Collection Time: 10/13/21  7:21 PM   Result Value Ref Range    INR 1.55 (H) 0.85 - 1.15   PTT    Collection Time: 10/13/21  7:21 PM   Result Value Ref Range    aPTT 47 (H) 22 - 38 Seconds   Ethyl Alcohol Level    Collection Time: 10/13/21  7:21 PM   Result Value Ref Range    Alcohol, Blood 108 (H) None detected mg/dL   HCG QUALitative pregnancy (blood)    Collection Time: 10/13/21  7:21 PM   Result Value Ref Range    hCG Serum Qualitative Negative Negative   Lactic acid whole blood    Collection Time: 10/13/21  7:21 PM   Result Value Ref Range    Lactic Acid 21.0 (HH) 0.7 - 2.0 mmol/L   Extra Blue Top Tube    Collection Time: 10/13/21  7:21 PM   Result Value Ref Range    Hold Specimen JIC    Extra Red Top Tube    Collection Time: 10/13/21  7:21 PM   Result Value Ref Range    Hold Specimen JIC    Extra Purple Top Tube    Collection Time: 10/13/21  7:21 PM   Result Value Ref Range    Hold Specimen JIC    Ammonia (on ice)    Collection Time: 10/13/21  7:21 PM   Result Value Ref Range    Ammonia 116 (HH) 11 - 35 umol/L   Basic metabolic panel    Collection Time: 10/13/21  8:14 PM   Result Value Ref Range    Sodium 135 (L) 136 - 145 mmol/L    Potassium 2.8 (LL) 3.5 - 5.0 mmol/L    Chloride 86 (L) 98 - 107 mmol/L    Carbon Dioxide (CO2) 9 (LL) 22 - 31 mmol/L    Anion Gap 40 (H) 5 - 18 mmol/L    Urea Nitrogen 3 (L) 8 - 22 mg/dL    Creatinine 0.58 (L) 0.60 - 1.10 mg/dL    Calcium 7.9 (L) 8.5 - 10.5 mg/dL    Glucose 58 (LL) 70 - 125 mg/dL    GFR Estimate >90 >60 mL/min/1.73m2   Hepatic function panel    Collection Time: 10/13/21  8:14 PM   Result Value Ref Range    Bilirubin Total 12.2 (H) 0.0 - 1.0 mg/dL    Bilirubin Direct 7.0 (H) <=0.5 mg/dL    Protein Total 5.9 (L) 6.0 - 8.0 g/dL    Albumin 2.0 (L)  3.5 - 5.0 g/dL    Alkaline Phosphatase 522 (H) 45 - 120 U/L     (H) 0 - 40 U/L    ALT 55 (H) 0 - 45 U/L   Lipase    Collection Time: 10/13/21  8:14 PM   Result Value Ref Range    Lipase 758 (H) 0 - 52 U/L   Extra Green Top (Lithium Heparin) Tube    Collection Time: 10/13/21  8:14 PM   Result Value Ref Range    Hold Specimen JIC    Troponin I (now)    Collection Time: 10/13/21  8:14 PM   Result Value Ref Range    Troponin I 0.03 0.00 - 0.29 ng/mL   Magnesium    Collection Time: 10/13/21  8:14 PM   Result Value Ref Range    Magnesium 1.8 1.8 - 2.6 mg/dL   Acetaminophen level    Collection Time: 10/13/21  8:14 PM   Result Value Ref Range    Acetaminophen <3.0 (L) 10.0 - 20.0 ug/mL   Glucose by meter    Collection Time: 10/13/21  9:43 PM   Result Value Ref Range    GLUCOSE BY METER POCT 137 (H) 70 - 99 mg/dL   UA with Microscopic reflex to Culture    Collection Time: 10/13/21 10:13 PM    Specimen: Urine, Rod Catheter   Result Value Ref Range    Color Urine Elly (A) Colorless, Straw, Light Yellow, Yellow    Appearance Urine Turbid (A) Clear    Glucose Urine Negative Negative mg/dL    Bilirubin Urine 6.0 mg/dL (A) Negative    Ketones Urine 40  (A) Negative mg/dL    Specific Gravity Urine 1.044 (H) 1.001 - 1.030    Blood Urine 0.1 mg/dL (A) Negative    pH Urine 5.5 5.0 - 7.0    Protein Albumin Urine 30  (A) Negative mg/dL    Urobilinogen Urine <2.0 <2.0 mg/dL    Nitrite Urine Negative Negative    Leukocyte Esterase Urine Negative Negative    RBC Urine 4 (H) <=2 /HPF    WBC Urine 29 (H) <=5 /HPF    Squamous Epithelials Urine 3 (H) <=1 /HPF    Hyaline Casts Urine 8 (H) <=2 /LPF       No results found for: ABORH        RADIOLOGY:  Reviewed all pertinent imaging. Please see official radiology report.    XR Chest Port 1 View   Preliminary Result   IMPRESSION: Right PICC line tip at the cavoatrial junction. Heart size upper limits of normal. Pulmonary vascularity within normal limits. Mild patchy interstitial  infiltrates in the lower lungs greater on the right with a mild or low-grade pneumonitis    not excluded. Clinical correlation. Questionable small right pleural effusion. Elevation right hemidiaphragm. No significant bony abnormalities.            EKG:    Performed at: 19:33p    Impression: Sinus tachycardia at 130 bpm.  Flipped T waves noted in lead aVR and V1.  FL interval 112 ms, QRS 66 ms,  ms.  Nonspecific changes compared to EKG from May 20, 2021.  Prolonged QT at that time at 507.      I have independently reviewed and interpreted the EKG(s) documented above.        PROCEDURES:  PICC line by PICC nurse    The patient has signs of Septic Shock  The patient has signs of septic shock as evidenced by:  1. Presence of Sepsis, AND  2. Lactic Acidosis with value greater than or equal to 4    Time septic shock diagnosis confirmed = 7:48 PM  10/13/21   as this was the time when Lactate was resulted and the level was greater than or equal to 4    3 Hour Septic Shock Bundle Completion:  1. Initial Lactic Acid Result:   Recent Labs   Lab Test 10/13/21  1921 05/20/21  1110 05/25/20 2126   LACT 21.0* 2.2* 0.8     2. Blood Cultures before Antibiotics: Yes  3. Broad Spectrum Antibiotics Administered:  yes       Anti-infectives (From admission through now)    Start     Dose/Rate Route Frequency Ordered Stop    10/13/21 2000  vancomycin (VANCOCIN) 1,500 mg in sodium chloride 0.9 % 250 mL intermittent infusion      1,500 mg  over 90 Minutes Intravenous ONCE 10/13/21 1930 10/13/21 2228    10/13/21 1930  piperacillin-tazobactam (ZOSYN) 3.375 g vial to attach to  mL bag      3.375 g  over 30 Minutes Intravenous ONCE 10/13/21 1919 10/13/21 2141          4. IF patient is in septic shock within 6 hours of time zero, as defined by:  -Initial Hypotension:  2 low BP readings (SBP <90, MAP <65, or decrease > 40 from baseline due to infection) within 3 hrs of each other during the time period of 6hrs before and 6 hrs  after  time zero  -Lactate > or = 4  THEN: Fluid volume administered in ED:  Full 30 mL/kg bolus given (see amount below).    BMI Readings from Last 1 Encounters:   10/13/21 23.96 kg/m      30 mL/kg fluids based on weight: 2,080 mL  30 mL/kg fluids based on IBW (must be >= 60 inches tall): 1,850 mL    Septic Shock reassessment:  1. Repeat Lactic Acid Level: pending  2. No pressors yet at this time    I attest to having performed a repeat sepsis exam and assessment of perfusion at 10:59 PM   and the results demonstrate improved perfusion.          I, Naomie Vasquez, am serving as a scribe to document services personally performed by Dr. Sheila Erickson based on my observation and the provider's statements to me. I, Dr. Sheila Erickson MD attest that Naomie Vasquez is acting in a scribe capacity, has observed my performance of the services and has documented them in accordance with my direction.        Sheila Erickson M.D. [facility]  Emergency Medicine and Medical Toxicology  Formerly [facility] EMERGENCY DEPARTMENT  [facility] [facility] [address]  [phone]  Dept: [phone]           Sheila Erickson MD  10/13/21 8091

## 2021-10-14 PROBLEM — A41.9 SEPSIS (H): Status: ACTIVE | Noted: 2021-01-01

## 2021-10-14 NOTE — PLAN OF CARE
Major Shift Events:  Alert to self only. PERRLA, 4mm. Follows commands. Afebrile, sinus tachy 110-120s, several episodes of V-tach when laying on right (PICC). PICC verified and pulled back 4 cm. BP slowly becoming soft, several boluses, albumin in progress. Levo on standby. LS diminished on 4L NC. Redness blanchable on sacrum/coccyx, mepilex in place. No BM overnight. NPO. Critical labs: Lactic 27.0, pH 7.15. Bicarb 7, Co2 8 and . MD aware, boluses, Bicarb, albumin given. Further labs drawn. K+2.9, Mag 1.9 - protocols in place, K+ being replaced. BG 85, 55 (D50 given), 129. Unknown pt last drink. Mom updated, will be in this morning.     Plan: Monitor labs, respiratory status, place A-line    For vital signs and complete assessments, please see documentation flowsheets.    Problem: Alcohol Withdrawal  Goal: Alcohol Withdrawal Symptom Control  Outcome: No Change     Problem: Acute Neurologic Deterioration (Alcohol Withdrawal)  Goal: Optimal Neurologic Function  Outcome: No Change     Problem: Substance Misuse (Alcohol Withdrawal)  Goal: Readiness for Change Identified  Outcome: Declining

## 2021-10-14 NOTE — PROGRESS NOTES
CRRT STATUS NOTE    DATA:  Time:  6:19 PM  Pressures WNL:  yes  Filter Status:  WDL    Problems Reported/Alarms Noted: none    Supplies Present:  Yes      ASSESSMENT:  Patient Net Fluid Balance:  10/13:  -125 ml  10/14:  +2889 ml since MN  Vital Signs:  /69   Pulse 94   Temp (!) 96.5  F (35.8  C) (Axillary)   Resp (!) 33   Wt 72.2 kg (159 lb 2.8 oz)   SpO2 96%   BMI 24.93 kg/m    On Levophed and vasopressin gtts as well as D 10 gtt  Labs:  K 3.6, Cr 0.88, Lactate 23, WBC 4.8, Hgb 8.4, plts 77,   Goals of Therapy:  I=O    INTERVENTIONS:   Gave 2 unit(s) PRBCs and 1 plt.,  Running NaHCO3- on post     PLAN:  Meet goals of therapy.  Change circuit q 72 hrs and prn.  Call Resource RN with concerns and/or issues @ [phone].

## 2021-10-14 NOTE — ED NOTES
Patient had multiple runs of V-Tach when turned to her right side. Patient turned on her back and heart rhythm returned to sinus tachycardia. Provider notified; order to keep patient on her back.

## 2021-10-14 NOTE — PROCEDURES
[facility]    Central line    Date/Time: 10/14/2021 9:33 AM  Performed by: Allie Perla MD  Authorized by: Allie Perla MD   Indications: vascular access    UNIVERSAL PROTOCOL   Site Marked: Yes  Prior Images Obtained and Reviewed:  Yes  Required items: Required blood products, implants, devices and special equipment available    Patient identity confirmed:  Verbally with patient  NA - No sedation, light sedation, or local anesthesia  Confirmation Checklist:  Patient's identity using two indicators, procedure was appropriate and matched the consent or emergent situation and correct equipment/implants were available  Universal Protocol: the Joint Commission Universal Protocol was followed    Preparation: Patient was prepped and draped in usual sterile fashion    ESBL (mL):  5         ANESTHESIA    Local Anesthetic: Lidocaine 1% without epinephrine  Anesthetic Total (mL):  3      SEDATION    Patient Sedated: No      Preparation: skin prepped with 2% chlorhexidine  Skin prep agent dried: skin prep agent completely dried prior to procedure  Sterile barriers: all five maximum sterile barriers used - cap, mask, sterile gown, sterile gloves, and large sterile sheet  Hand hygiene: hand hygiene performed prior to central venous catheter insertion  Patient position: flat  Catheter type: triple lumen  Catheter size: 7 Fr  Ultrasound guidance: yes  Sterile ultrasound techniques: sterile gel and sterile probe covers were used  Number of attempts: 2  Successful placement: yes  Post-procedure: line sutured and dressing applied  Assessment: blood return through all ports and free fluid flow    PROCEDURE   Patient Tolerance:  Patient tolerated the procedure well with no immediate complications     Patient moved leg after first placement requiring withdrawal of needle prior to advancement of the wire.  Second attempt was successful.  Length of time physician/provider present  for 1:1 monitoring during sedation: 0

## 2021-10-14 NOTE — PHARMACY-VANCOMYCIN DOSING SERVICE
Pharmacy Empiric Dose Change Per Policy  Original Dose Ordered: 1500 mg IV q12h  Dose Changed To:  1250 mg IV q24h (patient started on CRRT today)  This dose change was based on the pharmacist's assessment of this patient's age, weight, concurrent drug therapy, treatment goals, whether patient's creatinine clearance adequately indicates renal function (factoring in age, muscle mass, fluid and clinical status), and, if applicable, prior pharmacokinetic data.    Creatinine Clearance=     Estimated Creatinine Clearance: 85.2 mL/min (based on SCr of 0.96 mg/dL).  Will continue to follow and modify dosage according to levels, organ function and clinical condition    Boy Valdez, PharmD, BCCCP

## 2021-10-14 NOTE — PROGRESS NOTES
CRRT INITIATION NOTE    Consent for CRRT Completed:   Emergency  Patient s Vascular Access: Catheter Placement Confirmed: yes      DATA:  Procedure:  CVVHDF  Start Time:  1026  Machine#:  1  Filter:  M150  Blood Flow:  180  ML/min  Pre-Replacement Solution:  Phoxillium 4/2.5  Post-Replacement Solution:  Na HCO3-  Dialysate Solution:  Phoxillium 4/2.5  Pre-Replacement Solution Rate:  900 mL/hr  Post-Replacement Solution Rate:  200 mL/hr  Dialysate Flow Rate:  900 mL/hr   Patient Removal Rate:  50 mL/hr  Anticoagulation Type and Rate:  n/a    ASSESSMENT:  How Patient Tolerated Initiation:   Vital Signs:   ST, BP 90/53(68) via maximus, Vented  Initial Pressures:  Access:  -61  Filter:  74  Return:  59  TMP:  51  Change in Filter Pressure:  -13      INTERVENTIONS:  none    PLAN:  Meet goals of therapy, I=0.  Change circuit q 72 hrs and prn.  Call Resource RN @ [phone] with concerns or issues.

## 2021-10-14 NOTE — PHARMACY-VANCOMYCIN DOSING SERVICE
Pharmacy Vancomycin Initial Note  Date of Service 2021  Patient's  1975  46 year old, female    Indication: Sepsis    Current estimated CrCl = CrCl cannot be calculated (Patient's most recent lab result is older than the maximum 30 days allowed.).    Creatinine for last 3 days  No results found for requested labs within last 72 hours.    Recent Vancomycin Level(s) for last 3 days  No results found for requested labs within last 72 hours.      Vancomycin IV Administrations (past 72 hours)      No vancomycin orders with administrations in past 72 hours.                Nephrotoxins and other renal medications (From now, onward)    Start     Dose/Rate Route Frequency Ordered Stop    10/13/21 193  piperacillin-tazobactam (ZOSYN) 3.375 g vial to attach to  mL bag      3.375 g  over 30 Minutes Intravenous ONCE 10/13/21 191            Contrast Orders - past 72 hours (72h ago, onward)    None                  Plan:  1. Vancomycin  1500  mg (21 mg/kg) IV once in emergency department.  2. Please re-consult pharmacy if continuing vancomycin as an inpatient.    Eleazar Beyer RPH

## 2021-10-14 NOTE — PROCEDURES
"Procedures  PICC Line Insertion Procedure Note  Pt. Name: Shaniqua May  MRN:        [id number]    Procedure: Insertion of a  Triple Lumen  5 fr  Bard SOLO (valved) Power PICC, Lot number [id number]    Indications: antibiotics    Contraindications : none noted    Procedure Details   Patient identified with 2 identifiers and \"Time Out\" conducted.  .     Central line insertion bundle followed: hand hygeine performed prior to procedure, site cleansed with cholraprep, hat, mask, sterile gloves,sterile gown worn, patient draped with maximum barrier head to toe drape, sterile field maintained.    The vein was assessed and found to be compressible and of adequate size. 3 ml 1% Lidocaine administered sq to the insertion site. A 5 Fr PICC was inserted into the brachial vein of the right arm with ultrasound guidance. One attempt(s) required to access vein.   Catheter threaded without difficulty. Good blood return noted.    Modified Seldinger Technique used for insertion.    The 8 sharps that are included in the PICC insertion kit were accounted for and disposed of in the sharps container prior to breakdown of the sterile field.    Catheter secured with Statlock, biopatch and Tegaderm dressing applied.    Findings:  Total catheter length  42 cm, with 3 cm exposed. Mid upper arm circumference is 27 cm. Catheter was flushed with 30 cc NS. Patient  tolerated procedure well.    Tip placement verified by 3cg tip location technology. Tip placement in the SVC.    CLABSI prevention brochure left at bedside.    Patient's primary RN notified PICC is ready for use.    Comments:           @[email or web address]@,BSN  [facility]        "

## 2021-10-14 NOTE — PROCEDURES
[facility]    Arterial line placement    Date/Time: 10/14/2021 9:30 AM  Performed by: Allie Perla MD  Authorized by: Allie Perla MD     UNIVERSAL PROTOCOL   Site Marked: Yes  Prior Images Obtained and Reviewed:  Yes  Required items: Required blood products, implants, devices and special equipment available    Patient identity confirmed:  Verbally with patient  NA - No sedation, light sedation, or local anesthesia  Confirmation Checklist:  Patient's identity using two indicators, procedure was appropriate and matched the consent or emergent situation and correct equipment/implants were available  Universal Protocol: the Joint Commission Universal Protocol was followed    Preparation: Patient was prepped and draped in usual sterile fashion    ESBL (mL):  0      Indication: multiple ABGs hemodynamic monitoring  Location: left radial      SEDATION    Patient Sedated: No      PROCEDURE DETAILS    Needle Gauge:  18  Seldinger technique: Seldinger technique used    Number of Attempts:  1  Post-procedure:  Line sutured and dressing applied    PROCEDURE   Length of time physician/provider present for 1:1 monitoring during sedation: 0

## 2021-10-14 NOTE — ANESTHESIA PROCEDURE NOTES
Airway         Procedure Start/Stop Times: 10/14/2021 9:17 AM  Staff -        Resident/Fellow: Nick Mcguire III, MD       Other Anesthesia Staff: Ochoa Taylor DO       Performed By: resident  Consent for Airway        Urgency: emergent       Consent: The procedure was performed in an emergent situation.  Indications and Patient Condition       Indications for airway management: airway protection       Induction type:RSI       Mask assessment prior to intubation: brief 2 handed mask (easy)    Final Airway Details       Final airway type: endotracheal airway       Successful airway: ETT - single  Endotracheal Airway Details        ETT size (mm): 8.0       Cuffed: yes       Successful intubation technique: direct laryngoscopy       DL Blade Type: MAC 3       Grade View of Cords: 2       Adjucts: stylet       Position: Center       Measured from: lips       Secured at (cm): 24       Bite block used: None    Post intubation assessment        Placement verified by: capnometry, equal breath sounds and chest rise        Number of attempts at approach: 1       Number of other approaches attempted: 0       Secured with: commercial tube pérez       Ease of procedure: easy       Dentition: Intact and Unchanged

## 2021-10-14 NOTE — PHARMACY-VANCOMYCIN DOSING SERVICE
"Pharmacy Vancomycin Initial Note  Date of Service 2021  Patient's  1975  46 year old, female    Indication: Sepsis    Current estimated CrCl = Estimated Creatinine Clearance: 141.1 mL/min (A) (based on SCr of 0.58 mg/dL (L)).    Creatinine for last 3 days  10/13/2021:  8:14 PM Creatinine 0.58 mg/dL    Recent Vancomycin Level(s) for last 3 days  No results found for requested labs within last 72 hours.      Vancomycin IV Administrations (past 72 hours)                   vancomycin (VANCOCIN) 1,500 mg in sodium chloride 0.9 % 250 mL intermittent infusion (mg) 1,500 mg New Bag 10/13/21 2008                Nephrotoxins and other renal medications (From now, onward)    Start     Dose/Rate Route Frequency Ordered Stop    10/14/21 0500  [Held by provider]  norepinephrine (LEVOPHED) 4 mg in sodium chloride 0.9 % 250 mL infusion CENTRAL     (Held by provider since Thu 10/14/2021 at 0500 by Rainer Power MD.Hold Reason: Change in Vitals.)    0.01-0.6 mcg/kg/min × 72.2 kg (Dosing Weight)  2.7-162.5 mL/hr  Intravenous CONTINUOUS 10/14/21 0433      10/14/21 0500  piperacillin-tazobactam (ZOSYN) 4.5 g vial to attach to  mL bag     Note to Pharmacy: For [facility], [facility] and [facility]: For Zosyn-naive patients, use the \"Zosyn initial dose + extended infusion\" order panel.    4.5 g  over 30 Minutes Intravenous EVERY 6 HOURS 10/14/21 0434            Contrast Orders - past 72 hours (72h ago, onward)    None          Loading dose: N/A  Regimen: 1500 mg IV every 12 hours.  Start time: 08:08 on 10/14/2021  Exposure target: AUC24 (range)400-600 mg/L.hr   AUC24,ss: 584 mg/L.hr  Probability of AUC24 > 400: 84 %  Ctrough,ss: 16.6 mg/L  Probability of Ctrough,ss > 20: 37 %  Probability of nephrotoxicity (Lodise RUY ): 12 %          Plan:  1. Continue vancomycin  1500 mg IV q12h.  (1500 mg IV X1 given PTA)  2. Vancomycin monitoring method: AUC  3. Vancomycin therapeutic monitoring goal: 400-600 mg*h/L  4. Pharmacy will check " vancomycin levels as appropriate in 1-3 Days.    5. Serum creatinine levels will be ordered daily for the first week of therapy and at least twice weekly for subsequent weeks.      Jonn Hoover, GENEH

## 2021-10-14 NOTE — PROGRESS NOTES
Admitted/transferred from: [facility]  Reason for admission/transfer: Higher level of care needed, lactic acid 23, acute liver failure, suspected sepsis.  2 RN skin assessment: completed by Lesley MONET RN, SYDNI Hunt   Result of skin assessment and interventions/actions: Redness blanchable to sacrum/coccyx - mepilex in place. Generalized bruising, mottling in lower extremities.  Height, weight, drug calc weight: Done  Patient belongings (see Flowsheet)  MDRO education added to care plan: N/A  ?

## 2021-10-14 NOTE — ED PROVIDER NOTES
11:49 PM.  Asked by nursing to see the patient.  Patient had moved to her right side and had several 4 5 beat runs of intermittent V. tach.  Patient was asymptomatic with these.  Patient moved to her back and symptoms resolved.  We continue monitoring.  Patient will be transferred momentarily to [place].  12:20 AM.  Called by laboratory about lactate level which is slightly higher than previous.  Patient already received blood cultures, double antibiotics, and IV fluids.  Further IV fluids ordered.  Patient is due to be transferred momentarily to the stepdown unit at [facility].  CODE STATUS clarified with patient.  Patient indicated that if she stops breathing she did want to be on a ventilator and resuscitated.     Krish Osullivan MD  10/13/21 4051       Krish Osullivan MD  10/14/21 0021       Krish Osullivan MD  10/14/21 0026

## 2021-10-14 NOTE — CONSULTS
HEPATOLOGY CONSULTATION    Date of Admission:  10/14/2021          ASSESSMENT AND RECOMMENDATIONS:   46 year old female with a history of alcohol use disorder who presents from OSH with alcoholic hepatitis, severe metabolic acidosis, severe sepsis, and acute hypoxic respiratory failure requiring MICU admission.      #. Alcohol Use Disorder  #. Alcoholic Hepatitis c/b Ascites and concern for HE.  Previous admissions over the past 1-2 years for alcohol withdrawal and alcoholic hepatitis. Patient has been told in past that her alcohol use disorder is a problem and offered CD treatment but has never pursued it. Ethanol level elevated to 180 and likely cause of decompensation. Infectious etiology will need to be ruled out. No evidence of thrombosis on OSH imaging. Transaminase pattern and hx not suggestive of tylenol overdose or HSV hepatitis.   Primary Hepatologist: None  Etiology: Likely Alcoholic   MELD-Na: 22    - Diagnostic paracentesis: cell count, differential, culture, total protein, albumin, and cytology    - Infectious hepatitis lab work pending: Hep A, Hep B (surface antigen, antibody, core), Hep C, HIV  - Lactulose, titrate to 2-3 bowel movements  - Continue Rifaximin   - RUQ abdominal US w/ doppler   - Discontinue NAC    # Macrocytic Anemia,   Hgb of 7.5 (5/2021 ~ 13). No symptoms of active bleeding. Iron studies normal. Retic index 0.37 suggestive of hyperproliferation.  - No indication for EGD  - Anemia workup pending: Haptoglobin, LDH, peripheral smear  - Continue Protonix 40 IV BID    #. Elevated Lipase  Lipase of 7112. Abdominal pain not suggestive of pancreatitis.  Non-pancreatic etiologies of lipase elevation should be considered including ruling out infectious disease .    #. Diarrhea   Present for several weeks, no hx of melena/hematochezia. Plan to  rule out infectious etiology and assess for blood in stool.  - C-difficle , Enteric Panel     Thank you for involving us in this patient's  care. Please do not hesitate to contact the GI service with any questions or concerns.     Patient care plan discussed with Dr. Ramirez, GI staff physician.    Hardik Caceres MD  Internal Medicine, PGY3          Chief Complaint:   We were asked by Dr Sandy Jolley  to evaluate this patient with decompensated cirrhosis.   History is obtained from the patient and the medical record.          History of Present Illness:   Shaniqua May is a 46 year old female with a history of alcohol use disorder who was transferred here from to outside hospital on 10/14.     Patient's mother did a wellness check on patient on 10/13 and found her altered, complaining of abdominal pain and diarrhea. Per chart review, patient also has had abdominal distention for the past month with loose stool and flu like illness. No blood in stool, hematemesis .  In the ED, she was tachycardic, hypothermic, and normotensive. Her transaminases and bilirubin was elevated. She was noted to have a bicarbonate 10, AG >40, lactate >12. Her Ethanol was elevated to 109, Tylenol <3, and drug screen was negative. CT head was negative. CT abdomen with evidence of hepatic steatosis and diffuse subcutaneous edema. No splenomegaly commented on.    Patient was transferred to [facility] ED. ED team discussed case w/ [facility] who recommended patient be given 10 mg IV Vitamin K, start on NAC and abx. She was then transferred to [facility] for further care. Given continued acidosis, upon transfer, ICU team proceeded w/ intubation on 10/14.     Patient goes to the liquor store every 3 days. Her alcoholic beverages include wine, soda water and liquor. She owns a bar. Her drinking has gotten worse the past month to year during the pandemic when her friend also began drinking more. Specifically, they would drink together, smoke marijuana, and watch moves. She has been told in the past that her drinking is a problem from health care providers. She also had several admission for  alcohol withdrawal and alcoholic hepatitis. She has never sought chemical dependency treatment, but has been offered it during previous admissions. No hx of DUI. She has siblings in [facility], and lives alone. No family history of alcohol use disorder.             Past Medical History:   Reviewed and edited as appropriate  Past Medical History:   Diagnosis Date    Alcohol abuse     Alcohol withdrawal (H)     Alcoholic hepatitis 2020    Lyme disease             Past Surgical History:   Reviewed and edited as appropriate   Past Surgical History:   Procedure Laterality Date    NO HISTORY OF SURGERY              Previous Endoscopy:   No results found for this or any previous visit.         Social History:   Reviewed and edited as appropriate  Social History     Socioeconomic History    Marital status:      Spouse name: Not on file    Number of children: Not on file    Years of education: Not on file    Highest education level: Not on file   Occupational History    Occupation: Owns a bar   Tobacco Use    Smoking status: Former Smoker     Types: Cigarettes     Quit date: 2020     Years since quittin.3    Smokeless tobacco: Never Used   Substance and Sexual Activity    Alcohol use: Yes     Alcohol/week: 10.0 standard drinks     Comment: Alcoholic Drinks/day: hadnt been drinking until recently and within last few months started drinking daily. 1-2 bottle of wine. Goes to the liquour store [employer]. Drink of choice is soda water and hard liqour. Never has been to CD treatment.    Drug use: Yes     Types: Marijuana    Sexual activity: Not Currently   Other Topics Concern    Not on file   Social History Narrative    ** Merged History Encounter **       Social Determinants of Health     Financial Resource Strain:     Difficulty of Paying Living Expenses:    Food Insecurity:     Worried About Running Out of Food in the Last Year:     Ran Out of Food in the Last Year:    Transportation Needs:     Lack of  "Transportation (Medical):     Lack of Transportation (Non-Medical):    Physical Activity:     Days of Exercise per Week:     Minutes of Exercise per Session:    Stress:     Feeling of Stress :    Social Connections:     Frequency of Communication with Friends and Family:     Frequency of Social Gatherings with Friends and Family:     Attends Jewish Services:     Active Member of Clubs or Organizations:     Attends Club or Organization Meetings:     Marital Status:    Intimate Partner Violence:     Fear of Current or Ex-Partner:     Emotionally Abused:     Physically Abused:     Sexually Abused:             Family History:   Reviewed and edited as appropriate  No known history of gastrointestinal/liver disease or  gastrointestinal malignancies       Allergies:   Reviewed and edited as appropriate     Allergies   Allergen Reactions    Sulfa (Sulfonamide Antibiotics) [Sulfa Drugs] Other (See Comments)     Patient stated learned during some testing            Medications:     Medications Prior to Admission   Medication Sig Dispense Refill Last Dose    metoprolol tartrate (LOPRESSOR) 25 MG tablet [METOPROLOL TARTRATE (LOPRESSOR) 25 MG TABLET] Take 0.5 tablets (12.5 mg total) by mouth 2 (two) times a day. 30 tablet 0     metoprolol tartrate (LOPRESSOR) 25 MG tablet [METOPROLOL TARTRATE (LOPRESSOR) 25 MG TABLET] Take 0.5 tablets (12.5 mg total) by mouth 2 (two) times a day. 60 tablet 1     ondansetron (ZOFRAN-ODT) 4 MG disintegrating tablet [ONDANSETRON (ZOFRAN-ODT) 4 MG DISINTEGRATING TABLET] Take 1 tablet (4 mg total) by mouth every 8 (eight) hours as needed for nausea. 12 tablet 0     pantoprazole (PROTONIX) 40 MG tablet [PANTOPRAZOLE (PROTONIX) 40 MG TABLET] Take 1 tablet (40 mg total) by mouth daily. 30 tablet 0     UNABLE TO FIND [UNABLE TO FIND] Apply 1 drop to eye as needed (for eye allergy symptoms). Med Name: \"silver\" -  All natural allergy eye drop                Review of Systems:     A complete review of " systems was performed and is negative except as noted in the HPI           Physical Exam:   /69   Pulse 94   Temp (!) 96.2  F (35.7  C) (Axillary)   Resp (!) 33   Wt 72.2 kg (159 lb 2.8 oz)   SpO2 96%   BMI 24.93 kg/m    Wt:   Wt Readings from Last 2 Encounters:   10/14/21 72.2 kg (159 lb 2.8 oz)   10/13/21 69.4 kg (153 lb)      Constitutional: cooperative, pleasant dyspenic, in mild distress  Eyes: Sclera icteric  Ears/nose/mouth/throat: mucus membranes moist, hearing intact  Neck: supple  CV: No edema  Respiratory: labored breathing, on NC  Lymph: No submandibular, supraclavicular or inguinal lymphadenopathy  Abdomen: Distended, +bs, diffusely tender to palpation, no peritoneal signs  Skin: warm, perfused, + jaundice  Neuro: AAO x 3, positive asterixis  Psych: Normal affect         Data:   Labs and imaging below were independently reviewed and interpreted    BMP  Recent Labs   Lab 10/14/21  1559 10/14/21  1554 10/14/21  1219 10/14/21  1159 10/14/21  1112 10/14/21  0828 10/14/21  0540 10/14/21  0453 10/13/21  2143 10/13/21  2014   NA  --  137  137  --  139  --   --   --  137  --  135*   POTASSIUM  --  3.6  3.6  --  3.2*  --  4.0  --  2.9*   < > 2.8*   CHLORIDE  --  94  94  --  94  --   --   --  89*  --  86*   SIMON  --  7.2*  7.2*  --  7.6*  --   --   --  7.6*  --  7.9*   CO2  --  17*  17*  --  13*  --   --   --  8*  --  9*   BUN  --  4*  4*  --  4*  --   --   --  4*  --  3*   CR  --  0.88  0.88  --  0.96  --   --   --  0.81  --  0.58*   * 201*  201* 147* 135*   < >  --    < > 58*   < > 58*    < > = values in this interval not displayed.     CBC  Recent Labs   Lab 10/14/21  1554 10/14/21  1200 10/14/21  1200 10/14/21  0748 10/14/21  0748 10/14/21  0453 10/14/21  0453   WBC 4.8  --  8.6  --  9.5  --  13.7*   RBC 2.35*  --  1.97*  --  1.53*  --  2.00*   HGB 8.4*   < > 7.2*   < > 5.8*   < > 7.5*   HCT 26.3*  --  23.4*  --  19.5*  --  24.4*   *  --  119*  --  128*  --  122*   MCH 35.7*   --  36.5*  --  37.9*  --  37.5*   MCHC 31.9  --  30.8*  --  29.7*  --  30.7*   RDW 17.8*  --  16.7*  --  15.1*  --  14.8   PLT 77*  --  98*  --  97*  --  131*    < > = values in this interval not displayed.     INR  Recent Labs   Lab 10/14/21  1554 10/14/21  0453 10/13/21  1921   INR 2.24* 1.78* 1.55*     LFTs  Recent Labs   Lab 10/14/21  1554 10/14/21  1159 10/14/21  0453 10/13/21  2014   ALKPHOS  --  338* 461* 522*   AST  --  1,539* 967* 363*   ALT  --  68* 63* 55*   BILITOTAL  --  10.7* 10.6* 12.2*   PROTTOTAL  --  5.0* 5.6* 5.9*   ALBUMIN 1.5* 1.7*  1.7* 1.5* 2.0*      PANC  Recent Labs   Lab 10/14/21  0453 10/13/21  2014   LIPASE 7,112* 758*       Imaging:  Abdominal US: Pending

## 2021-10-14 NOTE — PROGRESS NOTES
Patient intubated with an 8.0 ETT secured 24cm at the teeth. Color change present on colormetric CO2 detector and bilateral breath sounds heard. Patient placed on mechanical ventilation CMV-AC rate 25, vt 500, PEEP 5, and 100% FiO2.     RT Parker on 10/14/2021 at 10:22 AM

## 2021-10-14 NOTE — PROGRESS NOTES
MEDICAL ICU Progress Note  10/14/2021    Date of Hospital Admission: 10/14/21  Date of ICU Admission: 10/14/21  Reason for Critical Care Admission: potential need for pressors  Date of Service (when I saw the patient): 10/14/2021    ASSESSMENT: Shaniqua Mya is a 45 yo woman with past medical history of alcohol use disorder presenting with severe sepsis, pancreatitis, and lactic acidosis.    Started on NAC (discontinued per hepatology recs)  ABG q4hs  Fluid resuscitation as needed  Started on D10 for hypoglycemia  Thiamine replacement protocol  Hepatitis panel sent and pending  Potassium replacement  Hepatology Consult placed for further management and recs  Started on Bicarb drip  Intubated  Started on precedex fentanyl for sedation  Obtained arterial line  Arterial dialysis line obtained, starting CRRT this AM for hypoxia        PLAN:    Neuro:  # Septic/hepatic encephalopathy  - [place] protocol with lactulose, rifaximin to be changed after she gets intubated  - ID treatments below  - Fluids for lactic acidosis  - No benzos  - ketamine etomidate for intubation  - Precedex, propofol for sedation  - Deep sedation -5 due to paralysis  - Started on rifaximin, lactulose     Pulmonary:  # Hypoxia 2/2 pneumonia vs. Atelectasis   Intubated due to respiratory distress secondary to profound metabolic acidosis. Constant overbreathing requiring deep sedation. Treated as ARDS.    Ventilation Mode: CMV/AC  (Continuous Mandatory Ventilation/ Assist Control)  FiO2 (%): 80 %  Rate Set (breaths/minute): 33 breaths/min  Tidal Volume Set (mL): 370 mL  PEEP (cm H2O): 8 cmH2O  Oxygen Concentration (%): 70 %  Peak Inspiratory Pressure (cm H2O) ([place] Ree): 28  Resp: (!) 33    - ABGs q4hs  - Adjust rate and TV for pH > 7.2    Cardiovascular:  # Hypotension  2/2 sepsis, SIRS with possible pancreatitis  - Fluid resuscitation  - Trend lactate, ABG throughout course of day  - TTE obtained     GI/Nutrition:  # Decompensated cirrhosis  2/2 alcohol use c/b ascites and HE  MELD-Na score: 22 at 10/14/2021 11:59 AM  MELD score: 22 at 10/14/2021 11:59 AM  Calculated from:  Serum Creatinine: 0.96 mg/dL (Using min of 1 mg/dL) at 10/14/2021 11:59 AM  Serum Sodium: 139 mmol/L (Using max of 137 mmol/L) at 10/14/2021 11:59 AM  Total Bilirubin: 10.7 mg/dL at 10/14/2021 11:59 AM  INR(ratio): 1.78 at 10/14/2021  4:53 AM  Age: 46 years  - HE treatment as above  - Paracentesis when clinically stable  - Broad spectrum antibiotics, need to rule out SBP  - Ethanol positive, unclear last drink   - No known history of varices, hemoglobin stable after morning transfusions, no evidence warranted at this point.  - Hepatology consulted, appreciate recs  - Daily MELD labs  - Morning TEG w/o evidence of coagulopathy    # Elevated lipase  Obtain imaging when clinically stable to assess for pancreatitis. Low suspicion given absence of abdominal pain on presentation.  NPO  Continue fluid resuscitation    Renal/Fluids/Electrolytes:  # Lactic acidosis  SIRS with pancreatitis vs. Infection  - S/p 1 L IVF bolus at OSH (crystalloid)  - 1 L LR here followed by 500 albumin  - Trending lactate  - Aim for pH > 7.2    # Hypokalemia  Volume losses vs. Malnutrition  - Replacement protocol  - Monitor lytes    # Oliguria  # Metabolic acidosis  Started on CRRT for acidosis and oliguria  Aim for pH > 7.2    Endocrine:  # Hypoglycemia  2/2 liver synthetic dysfunction, possibly islet cell malfunction, acute alcohol intoxication  - Hypoglycemia protocol  - Started on D10 maintenance fluid, titrate according to blood glucose levels.    ID:  # Sepsis of unclear source   Abdominal (cholangitis vs. Translocation vs. SBP) vs. Pneumonia  - Empiric treatment with vanc/zosyn  - Sputum Culture, blood culture urine cultures pending  - U/S negative for focal hepatic mass.     Hematology:    # Leukocytosis  Infection vs. Pancreatitis  - Fluids, abxs, and trend    # Acute anemia   2/2 unknown GI losses vs.  Other source in setting of coagulopathy with cirrhosis  S/p 2 pRBCs  Hemoglobin stable, no evidence of acute bleeding  Will defer imaging for when patient is clinically stable    Musculoskeletal:  # PT/OT     Skin:  # No active issues    General Cares/Prophylaxis:    DVT Prophylaxis: Pneumatic Compression Devices  GI Prophylaxis: PPI  Restraints: none  Family Communication: mother and father updated in person  Code Status: Full    Lines/tubes/drains:  - R PICC  - PIVx1    Disposition:  - Medical ICU    Patient seen and findings/plan discussed with medical ICU staff, Dr. Jolley.    Marleen Fournier MD  Internal Medicine Resident PGY-1  [id number]    -----------------------------------------------------------------------    Overnight Interval:     Patient arrived from outside hospital overbreathing but alert. Significant metabolic acidosis noted, hemoglobin dropped, two units of blood given. Started on Bicarb  Drip for refractory metabolic acidosis, got more and more lethargic due to failure of respiratory compesnation. Eventually intubated, sedated with precedex and fentanyl, started on norepinephrine and vasopressin.       PHYSICAL EXAMINATION (performed before intubation)  Temp:  [97.9  F (36.6  C)-98.3  F (36.8  C)] 97.9  F (36.6  C)  Pulse:  [] 91  Resp:  [16-40] 33  BP: ()/(39-86) 116/69  MAP:  [58 mmHg-89 mmHg] 87 mmHg  Arterial Line BP: ()/(39-70) 114/70  FiO2 (%):  [80 %-100 %] 80 %  SpO2:  [89 %-100 %] 96 %  General: diffusely jaundiced  HEENT: scleral icterus, PERRLA, EOMI  Neuro: oriented to self, protecting airway, otherwise tangential speech, asterixis observed, cranial nerves intact at baseline    Pulm/Resp: mild crackles bilaterally  CV: RRR   Abdomen: Soft, mildly distended, hepatomegaly  : andres catheter in place, urine quincy and clear, tea-colored  Incisions/Skin: no rashes    LABS: Reviewed.   Arterial Blood Gases   Recent Labs   Lab 10/14/21  1202 10/14/21  0945 10/14/21  0880    PH 7.19* 7.23* 7.09*   PCO2 34* 28* 29*   PO2 90 183* 108*   HCO3 13* 12* 9*     Complete Blood Count   Recent Labs   Lab 10/14/21  1200 10/14/21  0748 10/14/21  0453 10/13/21  1921   WBC 8.6 9.5 13.7* 14.7*   HGB 7.2* 5.8* 7.5* 7.9*   PLT 98* 97* 131* 149*     Basic Metabolic Panel  Recent Labs   Lab 10/14/21  1219 10/14/21  1159 10/14/21  1112 10/14/21  0828 10/14/21  0804 10/14/21  0540 10/14/21  0453 10/13/21  2143 10/13/21  2014   NA  --  139  --   --   --   --  137  --  135*   POTASSIUM  --  3.2*  --  4.0  --   --  2.9*  --  2.8*   CHLORIDE  --  94  --   --   --   --  89*  --  86*   CO2  --  13*  --   --   --   --  8*  --  9*   BUN  --  4*  --   --   --   --  4*  --  3*   CR  --  0.96  --   --   --   --  0.81  --  0.58*   * 135* 124*  --  92   < > 58*   < > 58*    < > = values in this interval not displayed.     Liver Function Tests  Recent Labs   Lab 10/14/21  1159 10/14/21  0453 10/13/21  2014 10/13/21  1921   AST 1,539* 967* 363*  --    ALT 68* 63* 55*  --    ALKPHOS 338* 461* 522*  --    BILITOTAL 10.7* 10.6* 12.2*  --    ALBUMIN 1.7*  1.7* 1.5* 2.0*  --    INR  --  1.78*  --  1.55*     Coagulation Profile  Recent Labs   Lab 10/14/21  0453 10/13/21  1921   INR 1.78* 1.55*   PTT 39* 47*       IMAGING:  Recent Results (from the past 24 hour(s))   XR Chest Port 1 View    Narrative    EXAM: XR CHEST PORT 1 VIEW  LOCATION: [facility]  DATE/TIME: 10/13/2021 10:25 PM    INDICATION: Sepsis.  COMPARISON: None.      Impression    IMPRESSION: Right PICC line tip at the cavoatrial junction. Heart size upper limits of normal. Pulmonary vascularity within normal limits. Mild patchy interstitial infiltrates in the lower lungs greater on the right with a mild or low-grade pneumonitis   not excluded. Clinical correlation. Questionable small right pleural effusion. Elevation right hemidiaphragm. No significant bony abnormalities.   XR Chest Port 1 View    Narrative    Exam: TEMPORARY,  10/14/2021 5:23 AM    Indication: Verify PICC placement, evaluate chest.    Comparison: None    Findings:   Portable AP 20 degrees upright chest x-ray. Right upper cavity PICC  tip terminates over the right atrium.    Trachea is midline. Cardiac silhouette is within normal limits. Bony  vasculature is indistinct. Low lung volumes. Diffuse bilateral mixed  interstitial and airspace opacities. No pleural effusion or  pneumothorax.    Visualized osseous structures are intact. Visualized soft tissues and  upper abdomen are unremarkable.      Impression    Impression:  1.  Right upper extremity PICC tip terminates over the right atrium.  2.  Bilateral mixed interstitial and airspace opacities which may  represent pulmonary edema versus infection.    I have personally reviewed the examination and initial interpretation  and I agree with the findings.    PIA ELIZONDO MD         SYSTEM ID:  [id number]   XR Chest Port 1 View    Narrative    Exam: TEMPORARY, 10/14/2021 5:52 AM    Indication: Verify PICC placement.    Comparison: Chest x-ray 10/14/2021.    Findings:   Portable AP 45 degree upright chest x-ray. Right upper extremity PICC  tip terminates over the cavoatrial junction, retracted.    Trachea is midline. Cardiac silhouette is within normal limits.  Pulmonary vasculature is indistinct. Low lung volumes with unchanged  bilateral mixed interstitial and airspace opacities. No pneumothorax.    Visualized osseous structures are intact. Visualized soft tissues and  upper abdomen are unremarkable.      Impression    Impression:   1. Right upper extremity PICC tip now terminates over the cavoatrial  junction.  2. Unchanged diffuse bilateral mixed interstitial and airspace  opacities suggestive for pulmonary edema versus infection.     I have personally reviewed the examination and initial interpretation  and I agree with the findings.    PIA ELIZONDO MD         SYSTEM ID:  [id number]   XR Chest Port 1 View    Narrative     EXAM: XR Chest 1 view 10/14/2021 9:48 AM      HISTORY: Endotracheal tube positioning.    COMPARISON: Same date radiograph of the chest 0541.     TECHNIQUE: Frontal view of the chest.    FINDINGS: ET tube is 3.8 cm from the fidelia. PICC line with tip in the  low SVC.  Trachea is midline. Cardiac and mediastinal silhouette is obscured. No  cardiomegaly. Interval increase in the patchy perihilar mixed  interstitial and airspace opacities. The lung volumes. No pleural  effusions. No pneumothoraces. Visualized portions of the upper abdomen  is within normal limits. No acute osseous abnormalities.      Impression    IMPRESSION:   1. ET tube with tip 3.8 cm from the fidelia.  2. Interval increase in the patchy perihilar mixed interstitial,  airspace opacities.    I have personally reviewed the examination and initial interpretation  and I agree with the findings.    DAVID PEREZ MD         SYSTEM ID:  [id number]   XR Abdomen Port 1 View    Narrative    EXAMINATION:  XR ABDOMEN PORT 1 VIEWS 10/14/2021 9:49 AM     COMPARISON: none..    HISTORY: confirm R femoral venous line placement.    TECHNIQUE: Frontal view of the abdomen.    FINDINGS: Portable AP view of the pelvis and abdomen obtained in the  lumbar spine. Right femoral venous line in proper position with the  tip projecting over the distal IVC. Left-sided vascular catheter in  place. Urinary catheter in place. No abnormally dilated loops of  bowel. No pneumatosis. Phleboliths in the pelvis.      Impression    IMPRESSION: ] Right femoral venous line in proper placement with the  tip projecting over the distal IVC.    I have personally reviewed the examination and initial interpretation  and I agree with the findings.    ASHER HAWTHORNE MD         SYSTEM ID:  [id number]   US Abdomen Limited w Abd/Pelvis Duplex Complete    Narrative    EXAMINATION: US ABDOMEN LIMITED WITH DOPPLER COMPLETE 10/14/2021 11:46  AM     COMPARISON: CT abdomen and pelvis 5/20/2021    HISTORY:  History of cirrhosis and sepsis. Evaluate for portal vein  thrombosis    TECHNIQUE: The abdomen was scanned in standard fashion with  specialized ultrasound transducer(s) using both gray-scale, color  Doppler, and spectral flow techniques.    Findings:  Liver: Increased echogenicity of hepatic parenchyma with difficulty  penetrating the parenchyma. No evidence of a focal hepatic mass.     Extrahepatic portal vein flow is antegrade at 16 cm/s.  Right portal vein flow is antegrade, measuring 16 cm/s cm/s.  Left portal vein flow is to and fro, measuring 5 cm/s cm/s.    Flow in the hepatic artery is towards the liver and:  42 peak systolic  0.74 resistive index.     The splenic vein is stump visualized..  The middle hepatic vein is  patent with flow towards the IVC. Left and right hepatic veins are  visualized. The IVC is nonvisualized..    Gallbladder: The gallbladder wall is slightly thickened measuring 5  mm. There is trace amount of pericholecystic fluid. There is a  significant amount of sludge within the gallbladder. No positive  sonographic Razo's sign or evidence for cholelithiasis    Bile Ducts: Both the intra- and extrahepatic biliary system are of  normal caliber.  The common bile duct measures 5 mm.    Pancreas: Not visualized.    Right kidney: Not well visualized.    Fluid: No evidence of ascites or pleural effusions.        Impression    Impression:   1.  No evidence of portal vein thrombosis, however there is decreased  flow in the left portal vein demonstrating both antegrade and  retrograde flow consistent with portal hypertension.  2.  Slight gallbladder wall thickening, trace pericholecystic fluid,  and a significant amount of sludge within the gallbladder without  evidence of acute cholecystitis.  3.  Increased echogenicity and difficulty penetrating the hepatic  parenchyma, consistent with patient's known hepatic parenchymal  disease and steatosis.    I have personally reviewed the examination and  initial interpretation  and I agree with the findings.    ASHER HAWTHORNE MD         SYSTEM ID:  [id number]

## 2021-10-14 NOTE — PROCEDURES
[facility]    Hemodialysis line    Date/Time: 10/14/2021 9:31 AM  Performed by: Allie Perla MD  Authorized by: Allie Perla MD   Indications: CRRT.    UNIVERSAL PROTOCOL   Site Marked: Yes  Prior Images Obtained and Reviewed:  Yes  Required items: Required blood products, implants, devices and special equipment available    Patient identity confirmed:  Verbally with patient  NA - No sedation, light sedation, or local anesthesia  Confirmation Checklist:  Patient's identity using two indicators, procedure was appropriate and matched the consent or emergent situation and correct equipment/implants were available  Universal Protocol: the Joint Commission Universal Protocol was followed    Preparation: Patient was prepped and draped in usual sterile fashion    ESBL (mL):  3         ANESTHESIA    Local Anesthetic: Lidocaine 1% without epinephrine  Anesthetic Total (mL):  3      SEDATION    Patient Sedated: No      Preparation: skin prepped with 2% chlorhexidine  Skin prep agent dried: skin prep agent completely dried prior to procedure  Sterile barriers: all five maximum sterile barriers used - cap, mask, sterile gown, sterile gloves, and large sterile sheet  Hand hygiene: hand hygiene performed prior to central venous catheter insertion  Patient position: flat  Catheter type: double lumen  Catheter size: 12 Fr  Ultrasound guidance: yes  Sterile ultrasound techniques: sterile gel and sterile probe covers were used  Number of attempts: 1  Successful placement: yes  Post-procedure: line sutured and dressing applied  Assessment: blood return through all ports and free fluid flow    PROCEDURE    Abdominal x-ray is ordered and is still pending at this time  Length of time physician/provider present for 1:1 monitoring during sedation: 0

## 2021-10-14 NOTE — PROGRESS NOTES
[facility] MICU STAFF NOTE    Shaniqua May remains critically ill with multiorgan failure in the setting of decompensated liver disease      I personally examined and evaluated the patient today. I have evaluated all laboratory values and imaging studies from the past 24 hours.      Key findings and decisions made today included:     45 y/o old female with history of anxiety, prior history of alcoholic hepatitis presenting with multiorgan failure . Intubated/sedated, on CRRT for refractory acidosis, rising pressor requirements  . Acute anemia undergoing resuscitation . Empiric antibiotics . Acute liver disease workup sent . CT and USG w/o evidence of biliary tract obstruction, pancreatitis , budd chiari . Persistent lactemia despite resuscitation  . GI  following . Poor prognosis .     Family updated .       I personally managed the ventilator, sedation, pain control and analgesia, metabolic abnormalities, antibiotic therapy, and nutritional status.     Consults ongoing and ordered are: GI, renal     Procedures that will happen today are: mechanical ventilation, arterial line ,central line , dialysis line , endotracheal intubation     All treatments were placed at my direction.  I formulated today s plan with the house staff team or resident(s) and agree with the findings and plan in the associated note.      I spent a total of 60 minutes (excluding procedure time) personally providing and directing critical care services at the bedside and on the critical care unit for Shaniqua May     Sandy Jolley MD   Pulmonary and Critical Care Staff

## 2021-10-14 NOTE — H&P
MEDICAL ICU H&P  10/14/2021    Date of Hospital Admission: 10/14/21  Date of ICU Admission: 10/14/21  Reason for Critical Care Admission: potential need for pressors  Date of Service (when I saw the patient): 10/14/2021    ASSESSMENT: Shaniqua May is a 45 yo woman with past medical history of alcohol use disorder presenting with severe sepsis, pancreatitis, and lactic acidosis.    PLAN:    Neuro:  # Septic/hepatic encephalopathy  - [place] protocol with lactulose, rifaximin  - ID treatments below  - Fluids for lactic acidosis  - No benzos    Pulmonary:  # Hypoxia 2/2 pneumonia vs. Atelectasis   On 2 L nasal cannula, possibly some level of atelectasis with poor expansion or pneumonia based on initial CXR  - Broad spectrum antibiotics as below  - May benefit from IC  - Oxygen by nasal cannula as needed  - At risk for intubation due to airway protection with mental status    Cardiovascular:  # Hypotension  2/2 sepsis, SIRS with possible pancreatitis  - 1 L LR followed by 500 ml 5% albumin  - S/p 1 L bolus IVF at outside hospital  - Further boluses today  - Trend lactate, VBG throughout course of day  - Oxyhemoglobin of gas from PICC  - Echo    GI/Nutrition:  # Decompensated cirrhosis 2/2 alcohol use c/b ascites and HE  MELD-Na score: 22 at 10/14/2021  4:53 AM  MELD score: 22 at 10/14/2021  4:53 AM  Calculated from:  Serum Creatinine: 0.81 mg/dL (Using min of 1 mg/dL) at 10/14/2021  4:53 AM  Serum Sodium: 137 mmol/L at 10/14/2021  4:53 AM  Total Bilirubin: 10.6 mg/dL at 10/14/2021  4:53 AM  INR(ratio): 1.78 at 10/14/2021  4:53 AM  Age: 46 years  - HE treatment as above  - Consider paracentesis if safe window  - Broad spectrum antibiotics, need to rule out SBP  - Ethanol positive, unclear last drink   - No known history of varices, may need endoscopy to evaluate for new anemia (no reported emesis/melena)  - Hepatology consulted  - Daily MELD labs  - Imaging as below  - Consider further hepatitis workup    #  "Elevated lipase  No abdominal pain, CT to evaluate for pancreatitis, US to eval for stones/cholangitis  S/p 1 L IVF bolus at OSH (crystalloid)  1 L LR here followed by 500 albumin  Trending lactate    Renal/Fluids/Electrolytes:  # Lactic acidosis  SIRS with pancreatitis vs. Infection  - S/p 1 L IVF bolus at OSH (crystalloid)  - 1 L LR here followed by 500 albumin  - Trending lactate    # Hypokalemia  Volume losses vs. Malnutrition  - Replacement protocol  - Monitor lytes    Endocrine:  # Hypoglycemia  2/2 liver synthetic dysfunction, possibly islet cell malfunction?  - Hypoglycemia protocol    ID:  # Sepsis of unclear source   Abdominal (cholangitis vs. Translocation vs. SBP) vs. Pneumonia  - Vanc/zosyn for now  - Blood cultures and urine culture from outside hospital, will follow   - Recommend day team paracentesis with labs    Hematology:    # Leukocytosis  Infection vs. Pancreatitis  - Fluids, abxs, and trend    # Acute anemia   2/2 unknown GI losses vs. Other source in setting of coagulopathy with cirrhosis  - Trend  - Transfuse for hgb>7  - GI consulted    Musculoskeletal:  # PT/OT     Skin:  # No active issues    General Cares/Prophylaxis:    DVT Prophylaxis: Pneumatic Compression Devices  GI Prophylaxis: PPI  Restraints: none  Family Communication: mother updated by nursing  Code Status: Full    Lines/tubes/drains:  - R PICC  - PIVx1    Disposition:  - Medical ICU    Patient seen and findings/plan discussed with medical ICU staff, Dr. Perales.    Rainer Power    -----------------------------------------------------------------------    HISTORY PRESENTING ILLNESS:     Shaniqua May is a 47 yo woman with past medical history of alcohol use disorder initially presenting to outside hospital with altered mental status, brought in by her mother. She was found to have lactic acidosis to >12, mildly hypotensive not requiring pressors.     From outside hospital:    \"Patient's mother reported she went over " "to the patient's house after not hearing from her for a few days. She found the patient confused, lethargic, weak, bloated, and eyes and skin yellow. Patient reported abdominal bloating and diarrhea as well. Labs were remarkable for a lactate >12.0, and lipase 387.      CT abdomen pelvis: IMPRESSION:   1.  Dense hepatic steatosis with trace ascites.  2.  Diffuse subcutaneous edema consistent with elevated fluid status.  3.  Tiny fat-containing paraumbilical hernia.     CT head brain: IMPRESSION:  1.  No CT finding of a mass, hemorrhage or focal area suggestive of acute infarct.\"    REVIEW OF SYSTEMS:     PAST MEDICAL HISTORY:   Past Medical History:   Diagnosis Date     Alcohol abuse      Lyme disease      SURGICAL HISTORY:  No past surgical history on file.  SOCIAL HISTORY:  Social History     Socioeconomic History     Marital status:      Spouse name: Not on file     Number of children: Not on file     Years of education: Not on file     Highest education level: Not on file   Occupational History     Not on file   Tobacco Use     Smoking status: Former Smoker     Types: Cigarettes     Quit date: 2020     Years since quittin.3     Smokeless tobacco: Never Used   Substance and Sexual Activity     Alcohol use: Yes     Alcohol/week: 10.0 standard drinks     Comment: Alcoholic Drinks/day: hadnt been drinking until recently and within last few months started drinking daily. 1-2 bottle of wine     Drug use: Not Currently     Sexual activity: Not Currently   Other Topics Concern     Not on file   Social History Narrative    ** Merged History Encounter **       Social Determinants of Health     Financial Resource Strain:      Difficulty of Paying Living Expenses:    Food Insecurity:      Worried About Running Out of Food in the Last Year:      Ran Out of Food in the Last Year:    Transportation Needs:      Lack of Transportation (Medical):      Lack of Transportation (Non-Medical):    Physical Activity:  "     Days of Exercise per Week:      Minutes of Exercise per Session:    Stress:      Feeling of Stress :    Social Connections:      Frequency of Communication with Friends and Family:      Frequency of Social Gatherings with Friends and Family:      Attends Yarsani Services:      Active Member of Clubs or Organizations:      Attends Club or Organization Meetings:      Marital Status:    Intimate Partner Violence:      Fear of Current or Ex-Partner:      Emotionally Abused:      Physically Abused:      Sexually Abused:      FAMILY HISTORY:   No family history on file.  ALLERGIES:   Allergies   Allergen Reactions     Sulfa (Sulfonamide Antibiotics) [Sulfa Drugs] Other (See Comments)     Patient stated learned during some testing     MEDICATIONS:  [COMPLETED] 0.9% sodium chloride BOLUS  [COMPLETED] 0.9% sodium chloride BOLUS  [COMPLETED] dextrose 50 % injection 50 mL  [COMPLETED] lactulose (CHRONULAC) solution 30 g  [COMPLETED] LORazepam (ATIVAN) injection 0.5 mg  [COMPLETED] phytonadione 10 mg in sodium chloride 0.9 % 50 mL intermittent infusion  [COMPLETED] piperacillin-tazobactam (ZOSYN) 3.375 g vial to attach to  mL bag  [COMPLETED] potassium chloride 10 mEq in 100 mL sterile water intermittent infusion (premix)  [COMPLETED] vancomycin (VANCOCIN) 1,500 mg in sodium chloride 0.9 % 250 mL intermittent infusion    metoprolol tartrate (LOPRESSOR) 25 MG tablet, [METOPROLOL TARTRATE (LOPRESSOR) 25 MG TABLET] Take 0.5 tablets (12.5 mg total) by mouth 2 (two) times a day.  metoprolol tartrate (LOPRESSOR) 25 MG tablet, [METOPROLOL TARTRATE (LOPRESSOR) 25 MG TABLET] Take 0.5 tablets (12.5 mg total) by mouth 2 (two) times a day.  ondansetron (ZOFRAN-ODT) 4 MG disintegrating tablet, [ONDANSETRON (ZOFRAN-ODT) 4 MG DISINTEGRATING TABLET] Take 1 tablet (4 mg total) by mouth every 8 (eight) hours as needed for nausea.  pantoprazole (PROTONIX) 40 MG tablet, [PANTOPRAZOLE (PROTONIX) 40 MG TABLET] Take 1 tablet (40 mg total)  "by mouth daily.  UNABLE TO FIND, [UNABLE TO FIND] Apply 1 drop to eye as needed (for eye allergy symptoms). Med Name: \"silver\" -  All natural allergy eye drop        PHYSICAL EXAMINATION:  Temp:  [98.1  F (36.7  C)-98.3  F (36.8  C)] 98.1  F (36.7  C)  Pulse:  [116-133] 120  Resp:  [16-40] 28  BP: (115-144)/(60-86) 115/79  SpO2:  [92 %-100 %] 92 %  General: diffusely jaundiced  HEENT: scleral icterus  Neuro: oriented to self, protecting airway, otherwise tangential speech, asterixis observed    Pulm/Resp: mild crackles bilaterally  CV: RRR   Abdomen: Soft, mildly distended, hepatomegaly  : andres catheter in place, urine quincy and clear  Incisions/Skin: no rashes    LABS: Reviewed.   Arterial Blood Gases   No lab results found in last 7 days.  Complete Blood Count   Recent Labs   Lab 10/14/21  0453 10/13/21  1921   WBC 13.7* 14.7*   HGB 7.5* 7.9*   * 149*     Basic Metabolic Panel  Recent Labs   Lab 10/14/21  0558 10/14/21  0540 10/14/21  0453 10/14/21  0352 10/13/21  2143 10/13/21  2014   NA  --   --  137  --   --  135*   POTASSIUM  --   --  2.9*  --   --  2.8*   CHLORIDE  --   --  89*  --   --  86*   CO2  --   --  8*  --   --  9*   BUN  --   --  4*  --   --  3*   CR  --   --  0.81  --   --  0.58*   * 55* 58* 85   < > 58*    < > = values in this interval not displayed.     Liver Function Tests  Recent Labs   Lab 10/14/21  0453 10/13/21  2014 10/13/21  1921   * 363*  --    ALT 63* 55*  --    ALKPHOS 461* 522*  --    BILITOTAL 10.6* 12.2*  --    ALBUMIN 1.5* 2.0*  --    INR 1.78*  --  1.55*     Coagulation Profile  Recent Labs   Lab 10/14/21  0453 10/13/21  1921   INR 1.78* 1.55*   PTT 39* 47*       IMAGING:  Recent Results (from the past 24 hour(s))   XR Chest Port 1 View    Narrative    EXAM: XR CHEST PORT 1 VIEW  LOCATION: [facility]  DATE/TIME: 10/13/2021 10:25 PM    INDICATION: Sepsis.  COMPARISON: None.      Impression    IMPRESSION: Right PICC line tip at the " cavoatrial junction. Heart size upper limits of normal. Pulmonary vascularity within normal limits. Mild patchy interstitial infiltrates in the lower lungs greater on the right with a mild or low-grade pneumonitis   not excluded. Clinical correlation. Questionable small right pleural effusion. Elevation right hemidiaphragm. No significant bony abnormalities.   XR Chest Port 1 View    Impression    RESIDENT PRELIMINARY INTERPRETATION  Impression:  1.  Right upper extremity PICC tip terminates over the right atrium.  2.  Bilateral mixed interstitial and airspace opacities which may  represent pulmonary edema versus infection.   XR Chest Port 1 View    Impression    RESIDENT PRELIMINARY INTERPRETATION  Impression:   1. Right upper extremity PICC tip now terminates over the cavoatrial  junction.  2. Unchanged diffuse bilateral mixed interstitial and airspace  opacities suggestive for pulmonary edema versus infection.

## 2021-10-14 NOTE — PROGRESS NOTES
Nephrology Initial Consult  October 14, 2021      Shaniqua May MRN:[id number] YOB: 1975  Date of Admission:10/14/2021  Primary care provider: Sheila Morin  Requesting physician: Nick Davis MD    ASSESSMENT AND RECOMMENDATIONS:   HUONG-Baseline Cr 0.4, Cr only up slightly today but likely lagging behind her actual GFR in early phase of septic shock, may also have hemorrhagic shock with Hgb of 5.8.  Has lactate which is >28 and is now intubated on 2 pressors.  UA very concentrated with SG of 1.044, + bili, 30 of protein.   Started on CRRT this am with main indication being treatment of acidosis, also giving peripheral isotonic bicarb.  In volume expansion phase of sepsis so can hold on pulling fluids today.    -Line is temp RIJ femoral from 10/14   -started CRRT, 4k baths, no UF, bicarb post filter for now.     -Checking iCal frequently as Ca will drop with the aggressive bicarb administration, replacement ordered with CRRT.     -Can stop peripheral bicarb once pH is >7.20.      Volume-Volume expanding, also giving PRBC's for anemia.  Using isotonic bicarb, can stop this once pH is closer to normal.     Electrolytes/pH-K 3.2, on 4k baths, last bicarb 13, last pH 7.19.      BMD-Ca 7.6, watching closely with bicarb administration.  Mg and Phos WNL.     Liver-Bili 10.7, INR 1.8, unclear chronic vs acute component.      Nutrition-Deferred in acute shock, BUN noted to be very low which is likely a sign of poor nutrition prior to this admission.      Seen and discussed with Dr Razo    Recommendations were communicated to primary team via verbal communication.     Tyler Quintanilla, SONI CNS  Clinical Nurse Specialist  [phone]    REASON FOR CONSULT: Requested to evaluate 46 yof for management of metabolic acidosis and HUONG.      HISTORY OF PRESENT ILLNESS:  Shaniqua May is a 46 yof with hx of ETOH abuse, acute vs chronic liver dysfunction who is admitted with severe shock likely due to sepsis  "and metabolic acidosis.  Has had multiple admissions for ETOH withdrawal and acute hepatitis in the past 2 years, was + for ETOH on admission.  Having metabolic acidosis with pH of 7.09 at time of nephrology consult, also with dwindling UOP.      PAST MEDICAL HISTORY:  ETOH   Past Medical History:   Diagnosis Date     Alcohol abuse      Lyme disease        No past surgical history on file.     MEDICATIONS:  PTA Meds  Prior to Admission medications    Medication Sig Last Dose Taking? Auth Provider   metoprolol tartrate (LOPRESSOR) 25 MG tablet [METOPROLOL TARTRATE (LOPRESSOR) 25 MG TABLET] Take 0.5 tablets (12.5 mg total) by mouth 2 (two) times a day.   Nick Quintanilla, DO   metoprolol tartrate (LOPRESSOR) 25 MG tablet [METOPROLOL TARTRATE (LOPRESSOR) 25 MG TABLET] Take 0.5 tablets (12.5 mg total) by mouth 2 (two) times a day.   Hao Valadez MD   ondansetron (ZOFRAN-ODT) 4 MG disintegrating tablet [ONDANSETRON (ZOFRAN-ODT) 4 MG DISINTEGRATING TABLET] Take 1 tablet (4 mg total) by mouth every 8 (eight) hours as needed for nausea.   Nick Quintanilla, DO   pantoprazole (PROTONIX) 40 MG tablet [PANTOPRAZOLE (PROTONIX) 40 MG TABLET] Take 1 tablet (40 mg total) by mouth daily.   Nick Quintanilla, DO   UNABLE TO FIND [UNABLE TO FIND] Apply 1 drop to eye as needed (for eye allergy symptoms). Med Name: \"silver\" -  All natural allergy eye drop   Provider, Historical      Current Meds    artificial tears   Both Eyes Q8H     calcium gluconate  2 g Intravenous Once     folic acid  1 mg Oral Daily    Or     folic acid  1 mg Intravenous Daily     hydrocortisone sodium succinate PF  50 mg Intravenous Q6H     pantoprazole (PROTONIX) IV  40 mg Intravenous BID     piperacillin-tazobactam  4.5 g Intravenous Q6H     rifaximin  550 mg Oral BID     sodium bicarbonate         thiamine  500 mg Intravenous TID    Followed by     [START ON 10/16/2021] thiamine  250 mg Intravenous Daily    Followed by     [START ON 10/21/2021] thiamine  100 mg " Oral Daily     vitamin B1  100 mg Oral Daily    Or     thiamine  100 mg Intravenous Daily     [START ON 10/15/2021] vancomycin  1,250 mg Intravenous Q24H     Infusion Meds    acetylcysteine (ACETADOTE) infusion *third dose - elevated AST* 6.25 mg/kg/hr (10/14/21 0900)     cisatracurium       - MEDICATION INSTRUCTIONS -       dexmedetomidine 1.5 mcg/kg/hr (10/14/21 1401)     dextrose 10% 75 mL/hr at 10/14/21 0913     CRRT replacement solution 12.5 mL/kg/hr (10/14/21 1003)     fentaNYL 100 mcg/hr (10/14/21 0955)     - MEDICATION INSTRUCTIONS -       norepinephrine 0.16 mcg/kg/min (10/14/21 1411)     POST-filter replacement solution for CVVHD & CVVHDF (Sodium Bicarbonate in water 150 mEq/L for infusion) 200 mL/hr at 10/14/21 1146     CRRT replacement solution 12.5 mL/kg/hr (10/14/21 0958)     propofol (DIPRIVAN) infusion 50 mcg/kg/min (10/14/21 1402)     sodium bicabonate in 5% dextrose for infusion 150 mL/hr at 10/14/21 0940     vasopressin 2.4 Units/hr (10/14/21 1343)       ALLERGIES:    Allergies   Allergen Reactions     Sulfa (Sulfonamide Antibiotics) [Sulfa Drugs] Other (See Comments)     Patient stated learned during some testing       REVIEW OF SYSTEMS:  A 10 point review of systems was negative except as noted above.    SOCIAL HISTORY:   Social History     Socioeconomic History     Marital status:      Spouse name: Not on file     Number of children: Not on file     Years of education: Not on file     Highest education level: Not on file   Occupational History     Occupation: Owns a bar   Tobacco Use     Smoking status: Former Smoker     Types: Cigarettes     Quit date: 2020     Years since quittin.3     Smokeless tobacco: Never Used   Substance and Sexual Activity     Alcohol use: Yes     Alcohol/week: 10.0 standard drinks     Comment: Alcoholic Drinks/day: hadnt been drinking until recently and within last few months started drinking daily. 1-2 bottle of wine. Goes to the [employer] store [employer].  Drink of choice is soda water and hard liqour. Never has been to CD treatment.     Drug use: Yes     Types: Marijuana     Sexual activity: Not Currently   Other Topics Concern     Not on file   Social History Narrative    ** Merged History Encounter **       Social Determinants of Health     Financial Resource Strain:      Difficulty of Paying Living Expenses:    Food Insecurity:      Worried About Running Out of Food in the Last Year:      Ran Out of Food in the Last Year:    Transportation Needs:      Lack of Transportation (Medical):      Lack of Transportation (Non-Medical):    Physical Activity:      Days of Exercise per Week:      Minutes of Exercise per Session:    Stress:      Feeling of Stress :    Social Connections:      Frequency of Communication with Friends and Family:      Frequency of Social Gatherings with Friends and Family:      Attends Latter day Services:      Active Member of Clubs or Organizations:      Attends Club or Organization Meetings:      Marital Status:    Intimate Partner Violence:      Fear of Current or Ex-Partner:      Emotionally Abused:      Physically Abused:      Sexually Abused:      Reviewed with patient's family, noncontributory social hx.     FAMILY MEDICAL HISTORY:   Family History   Problem Relation Age of Onset     Alcoholism No family hx of      Reviewed with patient's family, no changes.     PHYSICAL EXAM:   Temp  Av.1  F (36.7  C)  Min: 97.9  F (36.6  C)  Max: 98.3  F (36.8  C)  Arterial Line BP  Min: 78/46  Max: 123/52  Arterial Line MAP (mmHg)  Av.5 mmHg  Min: 58 mmHg  Max: 89 mmHg      Pulse  Av  Min: 91  Max: 133 Resp  Av.3  Min: 16  Max: 40  FiO2 (%)  Av.7 %  Min: 80 %  Max: 100 %  SpO2  Av.8 %  Min: 89 %  Max: 100 %       /69   Pulse 99   Temp 97.9  F (36.6  C) (Axillary)   Resp (!) 33   Wt 72.2 kg (159 lb 2.8 oz)   SpO2 96%   BMI 24.93 kg/m     Date 10/14/21 07 - 10/15/21 0659   Shift 5965-91336647 4887-1026 0539-6525 24  Hour Total   INTAKE   I.V. 1271.05 115.6  1386.65   Other 2   2   Blood Components 901   901   Shift Total(mL/kg) 2174.05(30.11) 115.6(1.6)  2289.65(31.71)   OUTPUT   Urine 100   100   Other 29   29   Shift Total(mL/kg) 129(1.79)   129(1.79)   Weight (kg) 72.2 72.2 72.2 72.2      Admit Weight: 72.2 kg (159 lb 2.8 oz)     GENERAL APPEARANCE: Intubated and sedated, in no distress.   EYES: + scleral icterus  NECK:  No JVD  Pulmonary: lungs rhonchi to auscultation with equal breath sounds bilaterally, no clubbing or cyanosis  CV: Regular rhythm, normal rate, no rub   - Edema none  GI: soft, nontender, normal bowel sounds  MS: no evidence of inflammation in joints, no muscle tenderness  : + Rod  SKIN: no rash, warm, dry  NEURO: Intubated and sedated.     LABS:   CMP  Recent Labs   Lab 10/14/21  1219 10/14/21  1159 10/14/21  1112 10/14/21  0828 10/14/21  0804 10/14/21  0540 10/14/21  0453 10/13/21  2143 10/13/21  2014   NA  --  139  --   --   --   --  137  --  135*   POTASSIUM  --  3.2*  --  4.0  --   --  2.9*  --  2.8*   CHLORIDE  --  94  --   --   --   --  89*  --  86*   CO2  --  13*  --   --   --   --  8*  --  9*   ANIONGAP  --  32*  --   --   --   --  >35*  --  40*   * 135* 124*  --  92   < > 58*   < > 58*   BUN  --  4*  --   --   --   --  4*  --  3*   CR  --  0.96  --   --   --   --  0.81  --  0.58*   GFRESTIMATED  --  72  --   --   --   --  89  --  >90   SIMON  --  7.6*  --   --   --   --  7.6*  --  7.9*   MAG  --  1.6  --   --   --   --  1.9  --  1.8   PHOS  --  2.8  --   --   --   --  2.7  --   --    PROTTOTAL  --  5.0*  --   --   --   --  5.6*  --  5.9*   ALBUMIN  --  1.7*  1.7*  --   --   --   --  1.5*  --  2.0*   BILITOTAL  --  10.7*  --   --   --   --  10.6*  --  12.2*   ALKPHOS  --  338*  --   --   --   --  461*  --  522*   AST  --  1,539*  --   --   --   --  967*  --  363*   ALT  --  68*  --   --   --   --  63*  --  55*    < > = values in this interval not displayed.     CBC  Recent Labs   Lab  10/14/21  1200 10/14/21  0748 10/14/21  0453 10/13/21  1921   HGB 7.2* 5.8* 7.5* 7.9*   WBC 8.6 9.5 13.7* 14.7*   RBC 1.97* 1.53* 2.00* 2.15*   HCT 23.4* 19.5* 24.4* 25.1*   * 128* 122* 117*   MCH 36.5* 37.9* 37.5* 36.7*   MCHC 30.8* 29.7* 30.7* 31.5   RDW 16.7* 15.1* 14.8 14.8   PLT 98* 97* 131* 149*     INR  Recent Labs   Lab 10/14/21  0453 10/13/21  1921   INR 1.78* 1.55*   PTT 39* 47*     ABG  Recent Labs   Lab 10/14/21  1202 10/14/21  0945 10/14/21  0828 10/14/21  0748   PH 7.19* 7.23* 7.09*  --    PCO2 34* 28* 29*  --    PO2 90 183* 108*  --    HCO3 13* 12* 9*  --    O2PER 80 100 4 4      URINE STUDIES  Recent Labs   Lab Test 10/13/21  2213 05/20/21  1455   COLOR Elly* Colorless   APPEARANCE Turbid* Clear   URINEGLC Negative Negative   URINEBILI 6.0 mg/dL* Negative   URINEKETONE 40 * Trace   SG 1.044* 1.027   UBLD 0.1 mg/dL* Negative   URINEPH 5.5 5.0   PROTEIN 30 * Negative   UROBILINOGEN  --  <2.0 mg/dL   NITRITE Negative Negative   LEUKEST Negative Negative   RBCU 4*  --    WBCU 29*  --      No lab results found.  PTH  No lab results found.  IRON STUDIES  Recent Labs   Lab Test 10/14/21  0453 05/25/20  0757   IRON 76 158   FEB 76*  --    IRONSAT 100*  --    FRANCES 7,749* 3,688*

## 2021-10-15 NOTE — PROGRESS NOTES
SPIRITUAL HEALTH SERVICES  SPIRITUAL ASSESSMENT Progress Note  [facility]     REFERRAL SOURCE: Initial unit  visit with pt Shaniqua May and her parents, Terrell and Taniya, per family request for hospital  visit.    Parents requested prayers for their daughter before extubation. They shared life stories about their daughter, particularly how hard this time of pandemic has been for pt and others in the [employer] industry. They shared remembrances of pt's kindness towards others, and especially her love for her family.     PLAN: continued spiritual support available if requested.     Larry Steiner M.Div. (Bill), [facility]  Staff   Pager [phone]      * [facility] remains available 24/7 for emergent requests/referrals, either by having the switchboard page the on-call  or by entering an ASAP/STAT consult in Epic (this will also page the on-call ). Routine Epic consults receive an initial response within 24 hours.*

## 2021-10-15 NOTE — PLAN OF CARE
ICU End of Shift Summary. See flowsheets for vital signs and detailed assessment.    Changes this shift: Patient intubated at 0915 and now sedated, paralyzed and on CRRT. Fentanyl infusing at 100 mcg/hr, Propofol at 55 mcg/kg/min, and Cisatracurium at 3 mcg/kg/min. RASS -5. BIS readings were 32 and 40. TOF had 0 twitches out of 4 pulsations on the ulnar at 56 mA. Left radial ART line placed. Levophed infusing at 0.2mcg/kg/min and Vasopressin at 2.4 units/hr to maintain MAPs >65. 2 units of PRBCs given for a hemoglobin of 5.8. Unit of FFP given prior to line placements. Left and right femoral lines placed. Right femoral line for CRRT. CRRT: 4k baths, no UF, bicarb post filter and not pulling any fluids. Magnesium, potassium and calcium replaced twice. Phosphorus currently being replaced. Dextrose 10% infusing at 30 mL/hr for nutrient and to prevent hypoglycemia. US of abdomen done. Hypothermic (96.5 axillary). Jossie hugger on. Urine output decreasing. Voided 105 mL over 12 hours. Hepatology saw patient.     Plan: Collect sputum and fecal sample when able. Maintain MAPs >65. Replace electrolytes as needed. Notify provider of any changes or concerns.

## 2021-10-15 NOTE — PROGRESS NOTES
Sedated and paralyzed on cis, fentanyl, and propofol. Levo and vaso maxed out. Phenyl started. Unable to palpate or doppler dorsalis pedis pulses. MICU notified. On vent 100%, started inhaled flolan. Minimal output via andres, on CRRT. ABGs not improving after flolan started. MICU had code status and comfort care conversation with family. Around 1130 family made decision to go comfort care. Cis turned off. CRRT discontinued. Family present at bedside.  consulted. Extubated at 1350, time of death 1355. All drips turned off. Patient prepped and sent to [facility] with security. Belongings sent home with family.

## 2021-10-15 NOTE — PROGRESS NOTES
Nephrology Progress Note  10/15/2021       Shaniqua May is a 46 yof with hx of ETOH abuse, acute vs chronic liver dysfunction who is admitted with severe shock likely due to sepsis and metabolic acidosis.  Has had multiple admissions for ETOH withdrawal and acute hepatitis in the past 2 years, was + for ETOH on admission.  Having metabolic acidosis with pH of 7.09 at time of nephrology consult, also with dwindling UOP.  Started CRRT on 10/14     Interval History :   Mrs May continues to be unstable today, on 3 pressors and team is contemplating adding AT2.  Continuing on 2k baths and post filter bicarb but she is still acidotic at 7.17, continuing to monitor.  No source identified for sepsis as of now, trying to match I=O as able today but will likely be positive.      Assessment & Recommendations:   HUONG-Baseline Cr 0.4, Cr only up slightly today but likely lagging behind her actual GFR in early phase of septic shock, may also have hemorrhagic shock with Hgb of 5.8.  Has lactate which is >28 and is now intubated on 2 pressors.  UA very concentrated with SG of 1.044, + bili, 30 of protein.   Started on CRRT 10/14, have stopped peripheral bicarb as bicarb level is normalizing although still a bit acidotic.                  -Line is temp RIJ femoral from 10/14                -started CRRT, 4k baths, I=O as able, bicarb post filter for now.                  -Checking iCal frequently with the aggressive bicarb administration, replacement ordered with CRRT.       Volume-Volume expanding, also giving PRBC's for anemia.  Giving post filter bicarb with last pH 7.17 this am, stopped peripheral bicarb.       Electrolytes/pH-K 3.2, on 4k baths, last bicarb 17, last pH 7.17.       BMD-Ca 7.6, watching closely with bicarb administration.  Mg and Phos WNL.      Liver-Bili 10.7, INR 1.8, unclear chronic vs acute component.      Anemia-Hgb 9.3, acute management per team.       Nutrition-Deferred in acute shock, BUN noted to  be very low which is likely a sign of poor nutrition prior to this admission.       Seen and discussed with Dr Razo     Recommendations were communicated to primary team via verbal communication.        SONI Hayden CNS  Clinical Nurse Specialist  [phone]    Review of Systems:   I reviewed the following systems:  ROS not done due to vent/sedation.      Physical Exam:   I/O last 3 completed shifts:  In: 5349.54 [I.V.:4380.54; Other:18]  Out: 142 [Urine:105; Other:37]   /69   Pulse 113   Temp 98.4  F (36.9  C) (Axillary)   Resp (!) 33   Wt 81.7 kg (180 lb 1.9 oz)   SpO2 100%   BMI 28.21 kg/m       GENERAL APPEARANCE: Intubated and sedated, in no distress.   EYES: + scleral icterus  NECK:  No JVD  Pulmonary: lungs rhonchi to auscultation with equal breath sounds bilaterally, no clubbing or cyanosis  CV: Regular rhythm, normal rate, no rub   - Edema none  GI: soft, nontender, normal bowel sounds  MS: no evidence of inflammation in joints, no muscle tenderness  : + Rod  SKIN: no rash, warm, dry  NEURO: Intubated and sedated.     Labs:   All labs reviewed by me  Electrolytes/Renal - Recent Labs   Lab Test 10/15/21  0814 10/15/21  0553 10/15/21  0448 10/15/21  0421 10/15/21  0414 10/15/21  0039 10/15/21  0023 10/14/21  2006 10/14/21  2000 10/14/21  1559 10/14/21  1554   NA  --   --   --   --  136  --  137  --  136   < > 137  137   POTASSIUM  --   --   --   --  4.0  --  3.9  --  3.8   < > 3.6  3.6   CHLORIDE  --   --   --   --  94  --  94  --  93*   < > 94  94   CO2  --   --   --   --  17*  --  18*  --  17*   < > 17*  17*   BUN  --   --   --   --  4*  --  3*  --  3*   < > 4*  4*   CR  --   --   --   --  1.02  --  0.93  --  0.90   < > 0.88  0.88   * 141* 157*   < > 43*   < > 79   < > 125*   < > 201*  201*   SIMON  --   --   --   --  6.8*  --  6.7*  --  7.4*   < > 7.2*  7.2*   MAG  --   --   --   --  2.4*  --  2.3  --   --   --  2.5*   PHOS  --   --   --   --  2.4*  --  2.2*  --   --    --  1.6*    < > = values in this interval not displayed.       CBC -   Recent Labs   Lab Test 10/15/21  0756 10/15/21  0425 10/14/21  2359   WBC 5.1 5.1 5.8   HGB 9.3* 9.6* 9.6*   PLT 85* 91* 92*       LFTs -   Recent Labs   Lab Test 10/15/21  0414 10/15/21  0023 10/14/21  1554 10/14/21  1159 10/14/21  1159   ALKPHOS 353* 358*  --   --  338*   BILITOTAL 13.2* 12.8*  --   --  10.7*   ALT 88* 85*  --   --  68*   AST 2,077* 1,826*  --   --  1,539*   PROTTOTAL 4.8* 4.9*  --   --  5.0*   ALBUMIN 1.5* 1.5* 1.5*   < > 1.7*  1.7*    < > = values in this interval not displayed.       Iron Panel -   Recent Labs   Lab Test 10/14/21  0453 05/25/20  0757 05/25/20 0757   IRON 76  --  158   IRONSAT 100*  --   --    FRANCES 7,749*   < > 3,688*    < > = values in this interval not displayed.           Current Medications:    artificial tears   Both Eyes Q8H     calcium gluconate  2 g Intravenous Once     fentaNYL  50 mcg Intravenous Once     folic acid  1 mg Oral Daily    Or     folic acid  1 mg Intravenous Daily     glycopyrrolate  0.2 mg Intravenous Once     hydrocortisone sodium succinate PF  50 mg Intravenous Q6H     micafungin  100 mg Intravenous Q24H     midazolam  5 mg Intravenous Once     pantoprazole (PROTONIX) IV  40 mg Intravenous BID     piperacillin-tazobactam  4.5 g Intravenous Q6H     rifaximin  550 mg Oral BID     thiamine  500 mg Intravenous TID    Followed by     [START ON 10/16/2021] thiamine  250 mg Intravenous Daily    Followed by     [START ON 10/21/2021] thiamine  100 mg Oral Daily     vancomycin  1,250 mg Intravenous Q24H       cisatracurium 3 mcg/kg/min (10/15/21 1100)     - MEDICATION INSTRUCTIONS -       dextrose 10% 100 mL/hr at 10/15/21 0450     CRRT replacement solution 12.5 mL/kg/hr (10/15/21 0328)     epoprostenol (VELETRI) 20 mcg/mL in sterile water inhalation solution 20 ng/kg/min (10/15/21 0840)     fentaNYL 100 mcg/hr (10/15/21 1100)     - MEDICATION INSTRUCTIONS -       norepinephrine 0.6  mcg/kg/min (10/15/21 1100)     phenylephrine 1.5 mcg/kg/min (10/15/21 1100)     POST-filter replacement solution for CVVHD & CVVHDF (Sodium Bicarbonate in water 150 mEq/L for infusion) 200 mL/hr at 10/15/21 0224     CRRT replacement solution 12.5 mL/kg/hr (10/15/21 0328)     propofol (DIPRIVAN) infusion 55 mcg/kg/min (10/15/21 1100)     vasopressin 4 Units/hr (10/15/21 0700)     vasopressin 4 Units/hr (10/15/21 1100)

## 2021-10-15 NOTE — PROGRESS NOTES
[facility]    Hepatology Follow-up    CC: Abdominal Pain    Dx: Alcoholic Hepatitis   Alcohol use disorder   Macrocytic Anemia   Metabolic Acidosis   Shock     24 hour events:   - Ongoing pressor requirements: vaso 4 and norepinephrine 0.6  - Sedation: Fentanyl + propofol  - Ongoing CRRT; net even  - UOP in the last 24 hours: 230     Subjective:  - No fevers overnight   - Intubated + sedated, unable to obtain full history    Medications  Current Facility-Administered Medications   Medication Dose Route Frequency     artificial tears   Both Eyes Q8H     calcium gluconate  2 g Intravenous Once     folic acid  1 mg Oral Daily    Or     folic acid  1 mg Intravenous Daily     hydrocortisone sodium succinate PF  50 mg Intravenous Q6H     micafungin  100 mg Intravenous Q24H     pantoprazole (PROTONIX) IV  40 mg Intravenous BID     piperacillin-tazobactam  4.5 g Intravenous Q6H     rifaximin  550 mg Oral BID     thiamine  500 mg Intravenous TID    Followed by     [START ON 10/16/2021] thiamine  250 mg Intravenous Daily    Followed by     [START ON 10/21/2021] thiamine  100 mg Oral Daily     vancomycin  1,250 mg Intravenous Q24H     Review of systems  A 10-point review of systems was negative.    Examination  /69   Pulse 113   Temp 98.4  F (36.9  C) (Axillary)   Resp (!) 33   Wt 81.7 kg (180 lb 1.9 oz)   SpO2 100%   BMI 28.21 kg/m      Intake/Output Summary (Last 24 hours) at 10/15/2021 1111  Last data filed at 10/15/2021 1100  Gross per 24 hour   Intake 6152.31 ml   Output 181 ml   Net 5971.31 ml     Gen- Lying in bed  ENT- Conjunctiva icteric  CVS-Tachycardic  RS-Vented  Abd-Distended, soft.   - Rod in place: clear yellow urine  Extr-no Sameer edema  Skin- +jaundice  Neuro-intubated+sedated     Laboratory  Lab Results   Component Value Date     10/15/2021    POTASSIUM 4.0 10/15/2021    POTASSIUM 4.0 10/14/2021    CHLORIDE 94 10/15/2021    CO2 17 10/15/2021    BUN 4 10/15/2021     CR 1.02 10/15/2021     Lab Results   Component Value Date    BILITOTAL 13.2 10/15/2021    ALT 88 10/15/2021    AST 2,077 10/15/2021    ALKPHOS 353 10/15/2021     Lab Results   Component Value Date    WBC 5.1 10/15/2021    HGB 9.3 10/15/2021     10/15/2021    PLT 85 10/15/2021     Lab Results   Component Value Date    INR 2.13 10/15/2021     Hep B s Ag: non-reactive  Hep A IgM: non-reactive  Hep A IgG: +  Hep C Ab: negative  HIV: non-reactive    Radiology  CXR 10/15/21 - reviewed   Abdominal US 10/14/21 - reviewed     Endoscopy  No new procedures in the last 24 hours    Assessment  46 year old F with PMHx of alcohol use disorder (with ongoing use) who presented with alcoholic hepatitis on likely cirrhosis (in the setting of retrograde flow in left portal vein) and significant metabolic acidosis who was subsequently intubated + initiated on CRRT.    Ongoing shock, now on max dose norepinephrine + vasopressin. Abdominal US on 10/14 without ascites fluid; thus unable to perform a paracentesis. No clear other source of infection, although mixed interstitial and patchy opacities noted on CXR, concerning for pneumonia. Ongoing lactic acidosis with lactate in the 20s.     MELD-Na: 25    Recommendations  -- Appreciate infectious workup given ongoing shock of unclear etiology, given high risk for infectious etiologies in the setting of alcoholic hepatitis  -- Agree with TTE to assess for other potential etiologies of shock  -- Recommend nutrition consult with enteral nutrition while intubated    Discussed with attending, Dr. James Jeffrey MD  Gastroenterology & Hepatology Fellow  [phone]

## 2021-10-15 NOTE — PROGRESS NOTES
"CLINICAL NUTRITION SERVICES    Admission nutrition risk screen positive for \"unsure\" weight loss. Informed decision made to change pt s status to comfort care. Nutrition interventions discontinued and no further interventions planned at this time. RD can be consulted if needed.    RD signing off on 10/15/2021.    Susanne Valentin, MS, RD, LD, [facility]  MICU pager: [phone]  ASCOM: [id number]    "

## 2021-10-15 NOTE — DEATH PRONOUNCEMENT
MD DEATH PRONOUNCEMENT    Called to pronounce Shaniqua May dead.    Physical Exam: Unresponsive to noxious stimuli, Spontaneous respirations absent, Breath sounds absent, Heart sounds absent and Pupillary light reflex absent    Patient was pronounced dead at 2:00 PM, October 15, 2021.    Preliminary cause of death: Multi-organ failure    Active Problems:    Sepsis (H)    Acute kidney injury (H)     Infectious disease present?: NO    Communicable disease present? (examples: HIV, chicken pox, TB, Ebola, CJD) :  NO    Multi-drug resistant organism present? (example: MRSA): NO    Please consider an autopsy if any of the following exist:  NO Unexpected or unexplained death during or following any dental, medical, or surgical diagnostic treatment procedures.   NO Death of mother at or up to seven days after delivery.     NO All  and pediatric deaths.     NO Death where the cause is sufficiently obscure to delay completion of the death certificate.   NO Deaths in which autopsy would confirm a suspected illness/condition that would affect surviving family members or recipients of transplanted organs.     The following deaths must be reported to the 's Office:  NO A death that may be due entirely or in part to any factors other than natural disease (recent surgery, recent trauma, suspected abuse/neglect).   NO A death that may be an accident, suicide, or homicide.     NO Any sudden, unexpected death in which there is no prior history of significant heart disease or any other condition associated with sudden death.   NO A death under suspicious, unusual, or unexpected circumstances.    NO Any death which is apparently due to natural causes but in which the  does not have a personal physician familiar with the patient s medical history, social, or environmental situation or the circumstances of the terminal event.   NO Any death apparently due to Sudden Infant Death Syndrome.     NO Deaths that occur  during, in association with, or as consequences of a diagnostic, therapeutic, or anesthetic procedure.   NO Any death in which a fracture of a major bone has occurred within the past (6) six months.   NO A death of persons note seen by their physician within 120 days of demise.     NO Any death in which the  was an inmate of a public institution or was in the custody of Law Enforcement personnel.   NO  All unexpected deaths of children   NO Solid organ donors   NO Unidentified bodies   NO Deaths of persons whose bodies are to be cremated or otherwise disposed of so that the bodies will later be unavailable for examination;   NO Deaths unattended by a physician outside of a licensed healthcare facility or licensed residential hospice program   NO Deaths occurring within 24 hours of arrival to a health care facility if death is unexpected.    NO Deaths associated with the decedent s employment.   NO Deaths attributed to acts of terrorism.   NO Any death in which there is uncertainty as to whether it is a medical examiner s care should be discussed with the medical investigator.      Body disposition: Autopsy was discussed with family member:  Family members in person.  Permission for autopsy was declined.    Luan Mosquera Jr, MD  Internal Medicine, PGY-3  [phone]  [facility] 1, [id number]

## 2021-10-15 NOTE — DISCHARGE SUMMARY
[facility]    Death Summary - Medicine & Pediatrics    Date of Admission:  10/14/2021  Date of Death: 10/15/2021  Attending Provider: Dr. Jolley  Service: MICU 1    Discharge Diagnoses   Severe Sepsis  Septic/Hepatic Encephalopathy  Pancreatitis  Lactic Acidosis  Hypoxia 2/2 PNA vs atelectasis  Decompensated cirrhosis 2/2 alcohol use c/b ascites and HE  Hypokalemia  Oliguria  Metabolic Acidosis  Hypoglycemia  Acute Anemia    Cause of death: Multi-organ System Failure    Hospital Course   Shaniqua May is a 46 year old female with a history of alcohol use disorder who was transferred here from to outside hospital on 10/14.      Patient's mother did a wellness check on patient on 10/13 and found her altered, complaining of abdominal pain and diarrhea. Per chart review, patient also has had abdominal distention for the past month with loose stool and flu like illness. No blood in stool, hematemesis .  In the ED, she was tachycardic, hypothermic, and normotensive. Her transaminases and bilirubin was elevated. She was noted to have a bicarbonate 10, AG >40, lactate >12. Her Ethanol was elevated to 109, Tylenol <3, and drug screen was negative. CT head was negative. CT abdomen with evidence of hepatic steatosis and diffuse subcutaneous edema. No splenomegaly commented on.     Patient was transferred to [facility] ED. ED team discussed case w/ [facility] who recommended patient be given 10 mg IV Vitamin K, start on NAC and abx. She was then transferred to [facility] for further care. Given continued acidosis, upon transfer, ICU team proceeded w/ intubation on 10/14. She also received a central line and arterial line. Renal was consulted, a dialysis line was placed, and she was started on CRRT. After 24 hours, her pressor needs increased to the point of 3 pressors and her ventilator needs increased as well. A care conference was conducted in the AM of 10/15/21, and it was collectively  decided to transition the patient to comfort cares. She passed away in the early afternoon of 10/15/21 surrounded by family.     Ellis Mosquera Jr., MD   Internal Medicine, PGY-3  [phone]  [facility] 1, [id number]  [facility]  ______________________________________________________________________      Significant Results and Procedures   Lactic Acid: 21, Anion Gap 25  AB.17/36/95/13/100%  TB 13.2, , ALT 88, AST 1077  Ammonia 116  Lipase 7,112  Procalcitonin 1.63    Consultations This Hospital Stay   PHYSICAL THERAPY ADULT IP CONSULT  OCCUPATIONAL THERAPY ADULT IP CONSULT  PHARMACY TO DOSE VANCO  GI HEPATOLOGY ADULT IP CONSULT  NEPHROLOGY ESRD ADULT IP CONSULT  GI HEPATOLOGY ADULT IP CONSULT  PHARMACY CRRT IP CONSULT  VASCULAR ACCESS CARE ADULT IP CONSULT  SPIRITUAL HEALTH SERVICES IP CONSULT    Primary Care Physician   Sheila Morin

## 2021-10-15 NOTE — PROGRESS NOTES
BRIEF SW NOTE    SW visited with pt's parents to offer support and parents shared many memories of pt's life. Pt's parents expressed their gratitude for the staff and care pt has received.     JOSE Vásquez, [facility]  ICU   [facility]  Ph: [phone]  [phone]

## 2021-10-18 LAB
HBV DNA SERPL QL NAA+PROBE: NORMAL
HCV RNA SERPL QL NAA+PROBE: NORMAL
HIV1+2 RNA SERPL QL NAA+PROBE: NORMAL

## 2021-10-19 LAB
BACTERIA BLD CULT: NO GROWTH
BACTERIA BLD CULT: NO GROWTH

## 2021-10-20 LAB
BACTERIA BLD CULT: NO GROWTH
BACTERIA BLD CULT: NO GROWTH

## 2021-10-27 LAB
CDT DISIALO/CDT TETRASIALO SERPL: 0.1 (ref 0–0.1)
CLINICAL BIOCHEMIST REVIEW: NORMAL